# Patient Record
Sex: MALE | Race: ASIAN | NOT HISPANIC OR LATINO | Employment: FULL TIME | URBAN - METROPOLITAN AREA
[De-identification: names, ages, dates, MRNs, and addresses within clinical notes are randomized per-mention and may not be internally consistent; named-entity substitution may affect disease eponyms.]

---

## 2018-08-28 ENCOUNTER — TELEPHONE (OUTPATIENT)
Dept: FAMILY MEDICINE | Facility: CLINIC | Age: 48
End: 2018-08-28

## 2018-08-28 PROBLEM — H40.009 GLAUCOMA SUSPECT: Status: ACTIVE | Noted: 2018-08-28

## 2018-08-28 PROBLEM — J31.0 RHINITIS SICCA: Status: ACTIVE | Noted: 2018-01-02

## 2018-08-28 PROBLEM — J34.89 NASAL VESTIBULITIS: Status: ACTIVE | Noted: 2018-01-02

## 2018-08-28 PROBLEM — M31.30: Status: ACTIVE | Noted: 2018-01-02

## 2018-08-28 RX ORDER — VIT C/E/ZN/COPPR/LUTEIN/ZEAXAN 250MG-90MG
5000 CAPSULE ORAL
COMMUNITY
Start: 2017-10-23

## 2018-08-28 NOTE — TELEPHONE ENCOUNTER
Spoke with pt who reports that he has been experiencing some tightness to his calf. Notes that he is active ad thinks that he might have pulled a muscle  Denies fever, chills, SOB, calf pain, swelling, warmth or redness.   Denies air travel or long car rides. Denies hx of clots  Pt will schedule appt for evaluation and was instructed to go directly to ED if he experiences any of the above symptoms

## 2018-08-29 ENCOUNTER — OFFICE VISIT (OUTPATIENT)
Dept: FAMILY MEDICINE | Facility: CLINIC | Age: 48
End: 2018-08-29
Payer: COMMERCIAL

## 2018-08-29 ENCOUNTER — TELEPHONE (OUTPATIENT)
Dept: FAMILY MEDICINE | Facility: CLINIC | Age: 48
End: 2018-08-29

## 2018-08-29 ENCOUNTER — HOSPITAL ENCOUNTER (OUTPATIENT)
Dept: RADIOLOGY | Age: 48
Discharge: HOME | End: 2018-08-29
Attending: FAMILY MEDICINE
Payer: COMMERCIAL

## 2018-08-29 VITALS
TEMPERATURE: 98 F | DIASTOLIC BLOOD PRESSURE: 82 MMHG | WEIGHT: 135 LBS | BODY MASS INDEX: 23.05 KG/M2 | HEIGHT: 64 IN | HEART RATE: 81 BPM | OXYGEN SATURATION: 98 % | SYSTOLIC BLOOD PRESSURE: 120 MMHG

## 2018-08-29 DIAGNOSIS — M79.662 PAIN OF LEFT CALF: ICD-10-CM

## 2018-08-29 DIAGNOSIS — M79.662 PAIN OF LEFT CALF: Primary | ICD-10-CM

## 2018-08-29 PROBLEM — I34.0 MITRAL VALVE INSUFFICIENCY: Status: ACTIVE | Noted: 2018-08-29

## 2018-08-29 PROBLEM — E78.5 DYSLIPIDEMIA: Status: ACTIVE | Noted: 2018-08-29

## 2018-08-29 PROCEDURE — 99214 OFFICE O/P EST MOD 30 MIN: CPT | Performed by: FAMILY MEDICINE

## 2018-08-29 PROCEDURE — 93971 EXTREMITY STUDY: CPT | Mod: LT

## 2018-08-29 RX ORDER — MELOXICAM 15 MG/1
15 TABLET ORAL DAILY
Qty: 15 TABLET | Refills: 0 | Status: SHIPPED | OUTPATIENT
Start: 2018-08-29 | End: 2018-10-24 | Stop reason: ALTCHOICE

## 2018-08-29 ASSESSMENT — ENCOUNTER SYMPTOMS
HEMATURIA: 0
SORE THROAT: 0
MYALGIAS: 1
FATIGUE: 0
ABDOMINAL PAIN: 0
DYSURIA: 0
SHORTNESS OF BREATH: 0
HEADACHES: 0
FEVER: 0

## 2018-08-29 NOTE — PROGRESS NOTES
[facility] at [facility]     Reason for visit:   Chief Complaint   Patient presents with   • Leg Pain      HPI  Has had left calf pain for about 5 days. Feels like a tightness or a pull. Is active: plays competitive [employer] 5 times a week. No swelling. Tried advil: no much help. No specific injury that he is aware of.  The pain is a 5 out of 10 and it is worse when he walks but the very worst is when he is sitting. . It tingles in his lower leg and foot at time but it is fleeting. No back pain. He is worried about a possible blood clot      Sushant Hazel is a 48 y.o. 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 No pertinent surgical history.  Social History     Social History   • Marital status:      Spouse name: N/A   • Number of children: N/A   • Years of education: N/A     Occupational History   • Not on file.     Social History Main Topics   • Smoking status: Never Smoker   • Smokeless tobacco: Never Used   • Alcohol use Yes      Comment: Socially    • Drug use: Unknown   • Sexual activity: Not on file     Other Topics Concern   • Not on file     Social History Narrative   • No narrative on file       Family History   Problem Relation Age of Onset   • Hyperlipidemia Mother    • Hyperlipidemia Father    • No Known Problems Sister    • No Known Problems Brother    • Prostate cancer Other    •  "Diabetes Other    • Hypertension Other        No Known Allergies    Current Outpatient Prescriptions   Medication Sig Dispense Refill   • cholecalciferol, vitamin D3, (VITAMIN D3) 1,000 unit capsule 2 capsules po once a day     • meloxicam (MOBIC) 15 mg tablet Take 1 tablet (15 mg total) by mouth daily. Take 1 tablet by oral route once a day with food 15 tablet 0     No current facility-administered medications for this visit.      Review of Systems   Constitutional: Negative for fatigue and fever.   HENT: Negative for ear pain and sore throat.    Respiratory: Negative for shortness of breath.    Cardiovascular: Negative for chest pain.   Gastrointestinal: Negative for abdominal pain.   Genitourinary: Negative for dysuria and hematuria.   Musculoskeletal: Positive for myalgias (left leg).   Skin: Negative for rash.   Neurological: Negative for headaches.     Objective   Vitals:    08/29/18 0912   BP: 120/82   BP Location: Right upper arm   Patient Position: Sitting   Pulse: 81   Temp: 36.7 °C (98 °F)   TempSrc: Oral   SpO2: 98%   Weight: 61.2 kg (135 lb)   Height: 1.626 m (5' 4\")     Body mass index is 23.17 kg/m².    Physical Exam   Constitutional: He appears well-developed and well-nourished. No distress.   HENT:   Head: Normocephalic and atraumatic.   Right Ear: Tympanic membrane and ear canal normal.   Left Ear: Tympanic membrane and ear canal normal.   Mouth/Throat: Oropharynx is clear and moist.   External nose normal   Eyes: Conjunctivae and lids are normal. Pupils are equal, round, and reactive to light.   Neck: No thyromegaly present.   Cardiovascular: Normal rate, regular rhythm and normal heart sounds.  Exam reveals no gallop and no friction rub.    No murmur heard.  Pulmonary/Chest: Effort normal and breath sounds normal. He exhibits no deformity.   Abdominal: Soft. There is no tenderness.   Musculoskeletal: He exhibits no edema.   Left lower extremity: no swelling. Positive calf tenderness. Negative " homans    Lymphadenopathy:     He has no cervical adenopathy.   Neurological: He is alert.   Skin: No cyanosis. Nails show no clubbing.   Psychiatric: He has a normal mood and affect.     Procedures       POINT OF CARE TESTING:    No results found for this visit on 08/29/18.     Assessment   Problem List Items Addressed This Visit     None      Visit Diagnoses     Pain of left calf    -  Primary    check ultrasound to rule out DVT. Meloxicam 15 mg once a day #15 as long as negative    Relevant Orders    Ultrasound venous leg left      He'll check on Tdap covergae     Educated patient on any new medications/doses that I prescribed today and patient expressed understanding and patient expressed understanding of and agreement with plan  No Follow-up on file.     Mercy Walton MD  8/29/2018

## 2018-08-29 NOTE — TELEPHONE ENCOUNTER
----- Message from DORA Gimenez sent at 8/29/2018  3:58 PM EDT -----  Please inform pt that his US is neg for DVT

## 2018-08-31 ENCOUNTER — HOSPITAL ENCOUNTER (EMERGENCY)
Facility: HOSPITAL | Age: 48
Discharge: HOME | End: 2018-08-31
Attending: EMERGENCY MEDICINE
Payer: COMMERCIAL

## 2018-08-31 ENCOUNTER — APPOINTMENT (EMERGENCY)
Dept: RADIOLOGY | Facility: HOSPITAL | Age: 48
End: 2018-08-31
Attending: EMERGENCY MEDICINE
Payer: COMMERCIAL

## 2018-08-31 ENCOUNTER — APPOINTMENT (EMERGENCY)
Dept: RADIOLOGY | Facility: HOSPITAL | Age: 48
End: 2018-08-31
Payer: COMMERCIAL

## 2018-08-31 VITALS
HEART RATE: 67 BPM | SYSTOLIC BLOOD PRESSURE: 130 MMHG | RESPIRATION RATE: 16 BRPM | OXYGEN SATURATION: 98 % | BODY MASS INDEX: 22.36 KG/M2 | WEIGHT: 131 LBS | HEIGHT: 64 IN | TEMPERATURE: 97.3 F | DIASTOLIC BLOOD PRESSURE: 79 MMHG

## 2018-08-31 DIAGNOSIS — R07.9 CHEST PAIN, UNSPECIFIED TYPE: Primary | ICD-10-CM

## 2018-08-31 DIAGNOSIS — R51.9 ACUTE NONINTRACTABLE HEADACHE, UNSPECIFIED HEADACHE TYPE: ICD-10-CM

## 2018-08-31 LAB
ALBUMIN SERPL-MCNC: 4.1 G/DL (ref 3.4–5)
ALP SERPL-CCNC: 76 IU/L (ref 35–126)
ALT SERPL-CCNC: 38 IU/L (ref 16–63)
ANION GAP SERPL CALC-SCNC: 8 MEQ/L (ref 3–15)
AST SERPL-CCNC: 43 IU/L (ref 15–41)
BASOPHILS # BLD: 0.02 K/UL (ref 0.01–0.1)
BASOPHILS NFR BLD: 0.4 %
BILIRUB SERPL-MCNC: 0.8 MG/DL (ref 0.3–1.2)
BUN SERPL-MCNC: 21 MG/DL (ref 8–20)
CALCIUM SERPL-MCNC: 9 MG/DL (ref 8.9–10.3)
CHLORIDE SERPL-SCNC: 103 MMOL/L (ref 98–109)
CO2 SERPL-SCNC: 26 MMOL/L (ref 22–32)
CREAT SERPL-MCNC: 1.1 MG/DL (ref 0.8–1.3)
D DIMER PPP IA.FEU-MCNC: 0.64 UG/ML (ref 0–0.5)
DIFFERENTIAL METHOD BLD: NORMAL
EOSINOPHIL # BLD: 0.12 K/UL (ref 0.04–0.54)
EOSINOPHIL NFR BLD: 2.2 %
ERYTHROCYTE [DISTWIDTH] IN BLOOD BY AUTOMATED COUNT: 11.7 % (ref 11.6–14.4)
GFR SERPL CREATININE-BSD FRML MDRD: >60 ML/MIN/1.73M*2
GLUCOSE SERPL-MCNC: 95 MG/DL (ref 70–99)
HCT VFR BLDCO AUTO: 44.7 % (ref 40.1–51)
HGB BLD-MCNC: 15.8 G/DL (ref 13.7–17.5)
IMM GRANULOCYTES # BLD AUTO: 0.02 K/UL (ref 0–0.08)
IMM GRANULOCYTES NFR BLD AUTO: 0.4 %
LYMPHOCYTES # BLD: 2.06 K/UL (ref 1.2–3.5)
LYMPHOCYTES NFR BLD: 38.4 %
MCH RBC QN AUTO: 32.5 PG (ref 28–33.2)
MCHC RBC AUTO-ENTMCNC: 35.3 G/DL (ref 32.2–36.5)
MCV RBC AUTO: 92 FL (ref 83–98)
MONOCYTES # BLD: 0.51 K/UL (ref 0.3–1)
MONOCYTES NFR BLD: 9.5 %
NEUTROPHILS # BLD: 2.64 K/UL (ref 1.7–7)
NEUTS SEG NFR BLD: 49.1 %
NRBC BLD-RTO: 0 %
PDW BLD AUTO: 11.2 FL (ref 9.4–12.4)
PLATELET # BLD AUTO: 181 K/UL (ref 150–350)
POTASSIUM SERPL-SCNC: 4.3 MMOL/L (ref 3.6–5.1)
PROT SERPL-MCNC: 6.9 G/DL (ref 6–8.2)
RBC # BLD AUTO: 4.86 M/UL (ref 4.5–5.8)
SODIUM SERPL-SCNC: 137 MMOL/L (ref 136–144)
TROPONIN I SERPL-MCNC: <0.02 NG/ML
WBC # BLD AUTO: 5.37 K/UL (ref 3.8–10.5)

## 2018-08-31 PROCEDURE — 63700000 HC SELF-ADMINISTRABLE DRUG: Performed by: NURSE PRACTITIONER

## 2018-08-31 PROCEDURE — 25800000 HC PHARMACY IV SOLUTIONS: Performed by: NURSE PRACTITIONER

## 2018-08-31 PROCEDURE — 63600105 HC IODINE BASED CONTRAST: Performed by: NURSE PRACTITIONER

## 2018-08-31 PROCEDURE — 71260 CT THORAX DX C+: CPT

## 2018-08-31 PROCEDURE — 85379 FIBRIN DEGRADATION QUANT: CPT | Performed by: NURSE PRACTITIONER

## 2018-08-31 PROCEDURE — 71046 X-RAY EXAM CHEST 2 VIEWS: CPT

## 2018-08-31 PROCEDURE — 85025 COMPLETE CBC W/AUTO DIFF WBC: CPT | Performed by: NURSE PRACTITIONER

## 2018-08-31 PROCEDURE — 36415 COLL VENOUS BLD VENIPUNCTURE: CPT | Performed by: NURSE PRACTITIONER

## 2018-08-31 PROCEDURE — 3E0337Z INTRODUCTION OF ELECTROLYTIC AND WATER BALANCE SUBSTANCE INTO PERIPHERAL VEIN, PERCUTANEOUS APPROACH: ICD-10-PCS | Performed by: EMERGENCY MEDICINE

## 2018-08-31 PROCEDURE — 99284 EMERGENCY DEPT VISIT MOD MDM: CPT | Mod: 25

## 2018-08-31 PROCEDURE — 80053 COMPREHEN METABOLIC PANEL: CPT | Performed by: NURSE PRACTITIONER

## 2018-08-31 PROCEDURE — 96360 HYDRATION IV INFUSION INIT: CPT | Mod: 59

## 2018-08-31 PROCEDURE — 84484 ASSAY OF TROPONIN QUANT: CPT | Performed by: NURSE PRACTITIONER

## 2018-08-31 PROCEDURE — 93005 ELECTROCARDIOGRAM TRACING: CPT | Performed by: NURSE PRACTITIONER

## 2018-08-31 RX ORDER — ACETAMINOPHEN 325 MG/1
650 TABLET ORAL ONCE
Status: COMPLETED | OUTPATIENT
Start: 2018-08-31 | End: 2018-08-31

## 2018-08-31 RX ADMIN — ACETAMINOPHEN 650 MG: 325 TABLET, FILM COATED ORAL at 23:42

## 2018-08-31 RX ADMIN — IOHEXOL 90 ML: 350 INJECTION, SOLUTION INTRAVENOUS at 22:15

## 2018-08-31 RX ADMIN — SODIUM CHLORIDE 1000 ML: 9 INJECTION, SOLUTION INTRAVENOUS at 22:41

## 2018-08-31 ASSESSMENT — ENCOUNTER SYMPTOMS
FEVER: 0
SINUS PAIN: 0
ABDOMINAL PAIN: 0
SINUS PRESSURE: 0
HEADACHES: 1
DIZZINESS: 0
NAUSEA: 0
SHORTNESS OF BREATH: 1
BACK PAIN: 0
LIGHT-HEADEDNESS: 0
VOMITING: 0
CHILLS: 0

## 2018-09-01 LAB
ATRIAL RATE: 68
P AXIS: 67
PR INTERVAL: 128
QRS DURATION: 80
QT INTERVAL: 364
QTC CALCULATION(BAZETT): 387
R AXIS: 29
RAINBOW HOLD SPECIMEN: NORMAL
T WAVE AXIS: 29
VENTRICULAR RATE: 68

## 2018-09-01 NOTE — ED ATTESTATION NOTE
Procedures  Physical Exam  Review of Systems    8/31/20189:28 PM  I have personally seen and examined the patient.  I reviewed and agree with the PA/NP/Resident's assessment and plan of care.    My examination, assessment, and plan of care of Sushant Hazel is as follows:    The patient presents with chest pain yesterday for 5 min described as a pain that hurt when he took a deep breath.  Was sitting on couch when it happened.    Exam: Lungs clear, heart normal, abdomen soft and non-tender.    Impression/Plan: EKG, chest x-ray neg.  Labs are pending.  Agree with NP management.    D-dimer was elevated so CT scan chest done to be sure there was no PE.  None found.  Agree with discharge.        Volodymyr Le MD  08/31/18 4881       Volodymyr Le MD  08/31/18 1384

## 2018-09-01 NOTE — ED PROVIDER NOTES
"HPI      48 y.o. male with pmh of mitral valve insufficiency presents to ED c/o cp x 1 day. He notes last night when taking a deep breath he felt a \"sharp cramp and squeezing\" in left sided chest lasting for 5 minutes associated with SOB and nausea that resolved on its own. Pt states currently has no cp but has HA located on top and posterior head. He took ASA PTA which provided no relief. Pt had stress test in 2017 that showed mitral valve insufficiency. Denies pmh/FHx of DVT, congestion, recent travel, abd pain, fever, chills, diaphoresis, back pain, sinus sxs.    Cardiologist Dr. Johnson        Chief Complaint   Patient presents with   • Chest Pain         History provided by:  Patient   used: No         Patient History     Past Medical History:   Diagnosis Date   • Kidney stones 06/2011   • Lyme disease 04/2004       History reviewed. No pertinent surgical history.    Family History   Problem Relation Age of Onset   • Hyperlipidemia Mother    • Hyperlipidemia Father    • No Known Problems Sister    • No Known Problems Brother    • Prostate cancer Other    • Diabetes Other    • Hypertension Other        Social History   Substance Use Topics   • Smoking status: Never Smoker   • Smokeless tobacco: Never Used   • Alcohol use Yes      Comment: Socially        Systems Reviewed from Nursing Triage:  Tobacco  Allergies  Meds  Problems  Med Hx  Surg Hx  Soc Hx         Review of Systems     Review of Systems   Constitutional: Negative for chills and fever.   HENT: Negative for congestion, sinus pain and sinus pressure.    Respiratory: Positive for shortness of breath (resolved).    Cardiovascular: Positive for chest pain.   Gastrointestinal: Negative for abdominal pain, nausea and vomiting.   Musculoskeletal: Negative for back pain.   Skin: Negative for rash.   Neurological: Positive for headaches. Negative for dizziness and light-headedness.   All other systems reviewed and are negative.       " Physical Exam     ED Triage Vitals   Temp Heart Rate Resp BP SpO2   08/31/18 2050 08/31/18 2050 08/31/18 2050 08/31/18 2050 08/31/18 2050   36.3 °C (97.3 °F) 73 16 (!) 165/98 97 %      Temp Source Heart Rate Source Patient Position BP Location FiO2 (%) (Set)   08/31/18 2050 08/31/18 2345 08/31/18 2050 08/31/18 2050 --   Oral Monitor Sitting Right upper arm        Pulse Ox %: 97 % (08/31/18 2132)  Pulse Ox Interpretation: Normal (08/31/18 2132)  Heart Rate: 73 (08/31/18 2132)  Rhythm Strip Interpretation: Normal Sinus Rhythm (08/31/18 2132)    No data found.          Physical Exam   Constitutional: He is oriented to person, place, and time. He appears well-developed and well-nourished. No distress.   HENT:   Head: Normocephalic and atraumatic.   Eyes: Conjunctivae are normal. Pupils are equal, round, and reactive to light.   Neck: Normal range of motion.   Cardiovascular: Normal rate, regular rhythm and normal heart sounds.    Pulmonary/Chest: Effort normal.   Abdominal: Soft. There is no tenderness.   Musculoskeletal: Normal range of motion.   Neurological: He is alert and oriented to person, place, and time.   CN grossly intact as tested   Skin: Skin is warm and dry. No rash noted.   Psychiatric: He has a normal mood and affect. His behavior is normal.   Nursing note and vitals reviewed.           Procedures    ED Course & MDM     Labs Reviewed   COMPREHENSIVE METABOLIC PANEL - Abnormal        Result Value    Sodium 137      Potassium 4.3      Chloride 103      CO2 26      BUN 21 (*)     Creatinine 1.1      Glucose 95      Calcium 9.0      AST (SGOT) 43 (*)     ALT (SGPT) 38      Alkaline Phosphatase 76      Total Protein 6.9      Albumin 4.1      Bilirubin, Total 0.8      eGFR >60.0      Anion Gap 8     D-DIMER - Abnormal     D-Dimer, Quant (FEU) 0.64 (*)    TROPONIN I - Normal    Troponin I <0.02     CBC - Normal    WBC 5.37      RBC 4.86      Hemoglobin 15.8      Hematocrit 44.7      MCV 92.0      MCH 32.5       MCHC 35.3      RDW 11.7      Platelets 181      MPV 11.2     RAINBOW DRAW PANEL    Narrative:     The following orders were created for panel order [place] Draw Panel.  Procedure                               Abnormality         Status                     ---------                               -----------         ------                     RAINBOW RED[[id number]]                                       Final result               RAINBOW LT BLUE[[id number]]                                   Final result               RAINBOW GOLD[[id number]]                                      Final result                 Please view results for these tests on the individual orders.   CBC AND DIFFERENTIAL    Narrative:     The following orders were created for panel order CBC and differential.  Procedure                               Abnormality         Status                     ---------                               -----------         ------                     CBC[[id number]]                           Normal              Final result               Diff Count[[id number]]                                        Final result                 Please view results for these tests on the individual orders.   DIFF COUNT    Differential Type Auto      nRBC 0.0      Immature Granulocytes 0.4      Neutrophils 49.1      Lymphocytes 38.4      Monocytes 9.5      Eosinophils 2.2      Basophils 0.4      Immature Granulocytes, Absolute 0.02      Neutrophils, Absolute 2.64      Lymphocytes, Absolute 2.06      Monocytes, Absolute 0.51      Eosinophils, Absolute 0.12      Basophils, Absolute 0.02     RAINBOW RED    [place] Tube RAINBOW HOLD SPECIMEN     RAINBOW LT BLUE    [place] Tube RAINBOW HOLD SPECIMEN     RAINBOW GOLD    [place] Tube RAINBOW HOLD SPECIMEN         ECG 12 lead   ED Interpretation   HR 68bpm, NSR, normal axis, normal NV, no ST elevation or depression      Final Result      X-RAY CHEST 2 VIEWS   ED Interpretation   No acute disease      EPP &  MS      Final Result   IMPRESSION:   No active disease.         CT CHEST PULMONARY EMBOLISM WITH IV CONTRAST   ED Interpretation   Preliminary Results:       FINDINGS:       Lungs: Clearly bilaterally. Central airways patent bilaterally.       Pulmonary Arteries: No pulmonary embolism.        Heart: Enlarged. No pericardial effusion.        Aorta: Patent/normal in caliber.        Lymph Nodes: No pathologic lymph nodes.       Imaged Abdomen: grossly normal.       Other: Mild circumferential wall thickening proximal and distal esophagus, likely secondary to underdistension.        Bones: Normal        IMPRESSION:    1) No evidence for pulmonary embolism.    2) No acute pulmonary pathology    3) Cardiomegaly.       Interpreted by: Eloy Mahmood MD, Aug 31, 2018 10:42 PM      Final Result   IMPRESSION: No evidence of pulmonary emboli.  No active disease seen in the   chest      COMMENT:      LOWER NECK: unremarkable.      AXILLA: No adenopathy seen..      PLEURA:  no significant abnormality seen..      LUNG PARENCHYMA:  no significant findings.      AORTA:  There is no significant abnormality.      MEDIASTINUM AND VERITO: There are no pulmonary emboli.  There is no adenopathy      HEART AND PERICARDIUM:  appears unremarkable.      CHEST WALL:  no significant abnormality.      BONES: no significant abnormality.      UPPER ABDOMEN AND ADRENAL GLANDS: no significant abnormality.                          MDM  Number of Diagnoses or Management Options  Acute nonintractable headache, unspecified headache type:   Chest pain, unspecified type:      Amount and/or Complexity of Data Reviewed  Clinical lab tests: reviewed  Tests in the medicine section of CPT®: reviewed  Discuss the patient with other providers: yes  Independent visualization of images, tracings, or specimens: yes    Patient Progress  Patient progress: stable           ED Course as of Sep 05 2158   Fri Aug 31, 2018   2122 Impression: Chest pain x1 episode, now  resolved and headache, no focal neuro deficits on exam, well appearing in room  Plan: labs, trop, Ddimer, EKG, cxray, IV fluids, observe  [EP]   2131 Troponin I: <0.02 [EP]   2157 D/w patient, CT chest ordered for r/o PE D-Dimer, Quant (FEU): (!) 0.64 [EP]   2255 CT chest shows: IMPRESSION:   1) No evidence for pulmonary embolism.   2) No acute pulmonary pathology   3) Cardiomegaly.     D/w patient, he is well appearing in room, no acute distress, reports chest pain still not present, O2 sat 98% on RA, HR 70's  Tylenol ordered for headache  Counseled to f/u with his cardiologist and strict return precautions  [EP]      ED Course User Index  [EP] Pallante, Elizabeth P, CRNP         Clinical Impressions as of Sep 05 2158   Chest pain, unspecified type   Acute nonintractable headache, unspecified headache type                  By signing my name below, Rachna PATEL, attest that this documentation has been prepared under the direction and in the presence of E. Pallante CRNP.    8/31/2018 9:17 PM      I, Elizabeth P. Pallante, personally performed the services described in this documentation, as documented by the scribe in my presence, and it is both accurate and complete.  9/5/2018 9:59 PM              Rachna Thapa  08/31/18 2125       Pallante, Elizabeth P, CRNP  09/05/18 2159

## 2018-09-01 NOTE — DISCHARGE INSTRUCTIONS
Drink plenty of fluids.    Take Tylenol for pain.    If you develop worsening chest pain, headache, fevers (>100.4) difficulty breathing or any other new or concerning symptoms return to the Emergency Department.

## 2018-09-03 PROBLEM — E78.2 MIXED HYPERLIPIDEMIA: Status: ACTIVE | Noted: 2018-08-29

## 2018-09-07 ENCOUNTER — OFFICE VISIT (OUTPATIENT)
Dept: CARDIOLOGY | Facility: CLINIC | Age: 48
End: 2018-09-07
Payer: COMMERCIAL

## 2018-09-07 VITALS
DIASTOLIC BLOOD PRESSURE: 68 MMHG | BODY MASS INDEX: 23.17 KG/M2 | OXYGEN SATURATION: 98 % | HEART RATE: 74 BPM | WEIGHT: 135 LBS | SYSTOLIC BLOOD PRESSURE: 122 MMHG

## 2018-09-07 DIAGNOSIS — R07.89 ATYPICAL CHEST PAIN: ICD-10-CM

## 2018-09-07 DIAGNOSIS — I34.0 NON-RHEUMATIC MITRAL REGURGITATION: ICD-10-CM

## 2018-09-07 DIAGNOSIS — E78.2 MIXED HYPERLIPIDEMIA: Primary | ICD-10-CM

## 2018-09-07 PROCEDURE — 99214 OFFICE O/P EST MOD 30 MIN: CPT | Performed by: INTERNAL MEDICINE

## 2018-09-07 RX ORDER — ADHESIVE BANDAGE
30 BANDAGE TOPICAL DAILY
COMMUNITY
End: 2020-06-19 | Stop reason: ALTCHOICE

## 2018-09-07 RX ORDER — UBIDECARENONE 100 MG
100 CAPSULE ORAL DAILY
COMMUNITY

## 2018-09-07 NOTE — ASSESSMENT & PLAN NOTE
Reviewed that it was mild and reassured.  Recommended that usually this is reevaluated 3-5 years which would mean 2-4 years from now.

## 2018-09-07 NOTE — PROGRESS NOTES
Cardiology Outpatient  Progress note    Sushant Hazel is a 48 y.o. male  who presents with an episode of chest pain for which he went to emergency department on August 31.  I reviewed the ER records, manually elevated d-dimer prompting a CTA which was unremarkable.  Troponin of the labs were negative except AST was 43 with a normal less than 41.    His initial symptom was a few episodes of chest pain worse with inspiration, pleuritic despite absence of any upper upper respiratory infection or trauma.  He has had this in the past but this 1 lasted a little longer, but over 5 minutes was little more intense prompting the evaluation.  His symptoms have since resolved.    In the emergency room he also told me is some follow-up regarding his mitral insufficiency which was noted on a stress echo from April 2017 and was mild.  Normally he is active without any cardiovascular symptoms.  No classic angina or exertional dyspnea etc.    Past medical, family, social history reviewed and there has been no significant interval change.     Past Medical History:   Diagnosis Date   • Kidney stones 06/2011   • Lyme disease 04/2004       : History reviewed. No pertinent surgical history.    Allergies:   Patient has no known allergies.    Current Outpatient Prescriptions   Medication Sig Dispense Refill   • cholecalciferol, vitamin D3, (VITAMIN D3) 1,000 unit capsule 2 capsules po once a day     • coenzyme Q10 (COQ10) 100 mg capsule Take 100 mg by mouth daily.     • magnesium hydroxide (M.O.M.) 400 mg/5 mL suspension Take 30 mL by mouth daily.     • meloxicam (MOBIC) 15 mg tablet Take 1 tablet (15 mg total) by mouth daily. Take 1 tablet by oral route once a day with food 15 tablet 0     No current facility-administered medications for this visit.           Social History     Social History   • Marital status:      Spouse name: N/A   • Number of children: N/A   • Years of education: N/A     Social History Main Topics   •  Smoking status: Never Smoker   • Smokeless tobacco: Never Used   • Alcohol use Yes      Comment: Socially    • Drug use: No   • Sexual activity: Not Asked     Other Topics Concern   • None     Social History Narrative   • None          Family History   Problem Relation Age of Onset   • Hyperlipidemia Mother    • Hyperlipidemia Father    • No Known Problems Sister    • No Known Problems Brother    • Prostate cancer Other    • Diabetes Other    • Hypertension Other        ROS  Review of Systems   Constitution: Negative for diaphoresis and fever.   HENT: Negative for hoarse voice and nosebleeds.    Eyes: Negative for blurred vision and visual disturbance.   Respiratory: Negative for hemoptysis and shortness of breath.    Endocrine: Negative for cold intolerance, heat intolerance and polydipsia.   Skin: Negative for rash.   Musculoskeletal: Negative for falls, muscle weakness and myalgias.   Gastrointestinal: Negative for hematemesis, hematochezia and jaundice.   Genitourinary: Negative for dysuria.   Neurological: Negative for brief paralysis, light-headedness and tremors.   Psychiatric/Behavioral: Negative for altered mental status and hallucinations.     Objective     Vitals:    09/07/18 1404   BP: 122/68   Pulse: 74   SpO2: 98%       Physical Exam  Physical Exam   Constitutional: He is oriented to person, place, and time. He appears well-developed.   HENT:   Head: Normocephalic.   Mouth/Throat: Oropharynx is clear and moist.   Eyes: Right eye exhibits no discharge. Left eye exhibits no discharge. No scleral icterus.   Neck: No JVD present. No tracheal deviation present. No thyromegaly present.   Cardiovascular: Normal rate and regular rhythm.  Exam reveals no gallop and no friction rub.    No murmur heard.  No chest wall pain or costochondral pain.  Pulmonary/Chest: Effort normal and breath sounds normal. No respiratory distress. He has no wheezes. He has no rales. He exhibits no tenderness.   Abdominal: He exhibits  no distension. There is no tenderness. There is no guarding.   Musculoskeletal: He exhibits no edema, tenderness or deformity.   Neurological: He is alert and oriented to person, place, and time.   Skin: Skin is warm and dry.   Psychiatric: He has a normal mood and affect.     Labs   Lab Results   Component Value Date    WBC 5.37 08/31/2018    HGB 15.8 08/31/2018    HCT 44.7 08/31/2018     08/31/2018    CHOL 196 10/19/2017    TRIG 222 (H) 10/19/2017    HDL 40 (L) 10/19/2017    ALT 38 08/31/2018    AST 43 (H) 08/31/2018     08/31/2018    K 4.3 08/31/2018     08/31/2018    CREATININE 1.1 08/31/2018    BUN 21 (H) 08/31/2018    CO2 26 08/31/2018       Imaging CTA pulmonary from 8/31:  No evidence of pulmonary emboli.  No active disease seen in the chest.    ECG EKG:  ECG from August 31 within normal limits    Problem List Items Addressed This Visit        Cardiology Problems    Mixed hyperlipidemia - Primary    Overview     Overview: 2017 calcium score 0         Current Assessment & Plan     Optimal diet and this can be reevaluated as he ages repeat screening etc.  Options discussed         Mitral valve insufficiency    Overview     Overview: 4/17 mild         Current Assessment & Plan     Reviewed that it was mild and reassured.  Recommended that usually this is reevaluated 3-5 years which would mean 2-4 years from now.            Other    Atypical chest pain    Current Assessment & Plan     This appear pleuritic lasting a little longer than 5 minutes.  He has similar less prolonged episodes in the past but this was little longer prompting emergency department evaluation on August 31.  I reviewed the CT was normal.  The only lab abnormality was an AST of 43 which was reviewed.  Follow-up recommended.    Symptoms have resolved.  Given the atypical symptoms, negative troponin, unremarkable ECG, negative CTA performed secondary to mildly elevated d-dimer and knowing that he had a negative stress echo in  April 2017 and a coronary calcium score of 0 May 2017 I do not think of additional cardiac testing is needed.  However, stressed that regardless of the above if symptoms progress we should probably reevaluate.             This patient note has been dictated using speech recognition software. Inadvertent speech recognition errors should be disregarded. Please do not hesitate to call my office for clarifications.    Khoi Johnson, DO  9/7/2018 4:01 PM

## 2018-09-07 NOTE — ASSESSMENT & PLAN NOTE
This appear pleuritic lasting a little longer than 5 minutes.  He has similar less prolonged episodes in the past but this was little longer prompting emergency department evaluation on August 31.  I reviewed the CT was normal.  The only lab abnormality was an AST of 43 which was reviewed.  Follow-up recommended.    Symptoms have resolved.  Given the atypical symptoms, negative troponin, unremarkable ECG, negative CTA performed secondary to mildly elevated d-dimer and knowing that he had a negative stress echo in April 2017 and a coronary calcium score of 0 May 2017 I do not think of additional cardiac testing is needed.  However, stressed that regardless of the above if symptoms progress we should probably reevaluate.

## 2018-10-24 ENCOUNTER — OFFICE VISIT (OUTPATIENT)
Dept: FAMILY MEDICINE | Facility: CLINIC | Age: 48
End: 2018-10-24
Payer: COMMERCIAL

## 2018-10-24 VITALS
WEIGHT: 134 LBS | OXYGEN SATURATION: 98 % | HEIGHT: 63 IN | BODY MASS INDEX: 23.74 KG/M2 | HEART RATE: 77 BPM | DIASTOLIC BLOOD PRESSURE: 64 MMHG | TEMPERATURE: 98.5 F | SYSTOLIC BLOOD PRESSURE: 134 MMHG

## 2018-10-24 DIAGNOSIS — Z00.00 ADULT GENERAL MEDICAL EXAMINATION: Primary | ICD-10-CM

## 2018-10-24 DIAGNOSIS — R79.89 LOW VITAMIN D LEVEL: ICD-10-CM

## 2018-10-24 DIAGNOSIS — Z23 NEED FOR VACCINATION: ICD-10-CM

## 2018-10-24 PROBLEM — J34.89 NASAL VESTIBULITIS: Status: RESOLVED | Noted: 2018-01-02 | Resolved: 2018-10-24

## 2018-10-24 PROBLEM — R07.89 ATYPICAL CHEST PAIN: Status: RESOLVED | Noted: 2018-09-07 | Resolved: 2018-10-24

## 2018-10-24 PROBLEM — M31.30: Status: RESOLVED | Noted: 2018-01-02 | Resolved: 2018-10-24

## 2018-10-24 PROCEDURE — 99396 PREV VISIT EST AGE 40-64: CPT | Mod: 25 | Performed by: FAMILY MEDICINE

## 2018-10-24 PROCEDURE — 90471 IMMUNIZATION ADMIN: CPT | Performed by: FAMILY MEDICINE

## 2018-10-24 PROCEDURE — 90686 IIV4 VACC NO PRSV 0.5 ML IM: CPT | Performed by: FAMILY MEDICINE

## 2018-10-24 ASSESSMENT — ENCOUNTER SYMPTOMS
ROS SKIN COMMENTS: NO CHANGE IN MOLES
FEVER: 0
ARTHRALGIAS: 0
BRUISES/BLEEDS EASILY: 0
MYALGIAS: 0
DIAPHORESIS: 0
DYSURIA: 0
SORE THROAT: 0
ROS GI COMMENTS: NO CHANGE IN BOWEL HABITS
HEMATURIA: 0
NUMBNESS: 0
EYE PAIN: 0
DYSPHORIC MOOD: 0
ABDOMINAL PAIN: 0
FATIGUE: 0
SHORTNESS OF BREATH: 0

## 2018-10-24 NOTE — PROGRESS NOTES
[facility] at [facility]     Reason for visit:   Chief Complaint   Patient presents with   • Annual Exam      HPI  Feeling well overall. Eats a balanced low fat diet and does exercise: plays [employer] 3 times a week  Sees eye doctor every 1-2 years for routine exams      Sushant Hazel is a 48 y.o. male      The following have been reviewed and updated as appropriate in this visit  Patient Active Problem List    Diagnosis Date Noted   • Mixed hyperlipidemia 08/29/2018   • Mitral valve insufficiency 08/29/2018   • Glaucoma suspect 08/28/2018   • Rhinitis sicca 01/02/2018   • Cervical arthritis 04/28/2015     Past Medical History:   Diagnosis Date   • Kidney stones 06/2011   • Lyme disease 04/2004     History reviewed. No pertinent surgical history.  Social History     Social History   • Marital status:      Spouse name: N/A   • Number of children: N/A   • Years of education: N/A     Occupational History   • Not on file.     Social History Main Topics   • Smoking status: Never Smoker   • Smokeless tobacco: Never Used   • Alcohol use Yes      Comment: Socially    • Drug use: No   • Sexual activity: Not on file     Other Topics Concern   • Not on file     Social History Narrative   • No narrative on file       Family History   Problem Relation Age of Onset   • Hyperlipidemia Mother    • Hyperlipidemia Father    • No Known Problems Sister    • No Known Problems Brother    • Prostate cancer Other    • Diabetes Other    • Hypertension Other        No Known Allergies    Current Outpatient Prescriptions   Medication Sig Dispense Refill   • cholecalciferol, vitamin D3, (VITAMIN D3) 1,000 unit capsule 2 capsules po once a day     • coenzyme Q10 (COQ10) 100 mg capsule Take 100 mg by mouth daily.     • magnesium hydroxide (M.O.M.) 400 mg/5 mL suspension Take 30 mL by mouth daily.       No current facility-administered medications for this visit.      Review of Systems   Constitutional: Negative for  "diaphoresis, fatigue and fever.   HENT: Negative for congestion, ear pain and sore throat.    Eyes: Negative for pain.   Respiratory: Negative for shortness of breath.    Cardiovascular: Negative for chest pain.   Gastrointestinal: Negative for abdominal pain.        No change in bowel habits   Genitourinary: Negative for dysuria and hematuria.   Musculoskeletal: Negative for arthralgias and myalgias.   Skin: Negative for rash.        No change in moles   Neurological: Negative for numbness.        No focal weakness   Hematological: Does not bruise/bleed easily.   Psychiatric/Behavioral: Negative for dysphoric mood.        PHQ2 score=0     Objective   Vitals:    10/24/18 1156   BP: 134/64   Pulse: 77   Temp: 36.9 °C (98.5 °F)   SpO2: 98%   Weight: 60.8 kg (134 lb)   Height: 1.6 m (5' 3\")     Body mass index is 23.74 kg/m².       Visual Acuity Screening    Right eye Left eye Both eyes   Without correction: 20/50 20/30 20/25   With correction:      Comments: (-) color blind     Physical Exam   Constitutional: He appears well-developed and well-nourished. No distress.   HENT:   Head: Normocephalic and atraumatic.   Right Ear: Tympanic membrane and ear canal normal.   Left Ear: Tympanic membrane and ear canal normal.   Mouth/Throat: Oropharynx is clear and moist.   External nose Normal   Eyes: Conjunctivae, EOM and lids are normal. Pupils are equal, round, and reactive to light.   Fundi WNL   Neck: No thyromegaly present.   Cardiovascular: Normal rate and regular rhythm.  Exam reveals no gallop and no friction rub.    No murmur heard.  Carotids 2+ bilaterally. No bruits   Pulmonary/Chest: Effort normal and breath sounds normal. He exhibits no deformity.   Abdominal: Soft. Bowel sounds are normal. He exhibits no distension and no mass. There is no hepatosplenomegaly. There is no tenderness. There is no CVA tenderness.   Genitourinary:   Genitourinary Comments: No testicular mass or inguinal hernia bilaterally "   Musculoskeletal: He exhibits no edema or deformity.   Lymphadenopathy:     He has no cervical adenopathy.   Neurological: He is alert. He displays no Babinski's sign on the right side. He displays no Babinski's sign on the left side.   Reflex Scores:       Bicep reflexes are 2+ on the right side and 2+ on the left side.       Brachioradialis reflexes are 2+ on the right side and 2+ on the left side.       Patellar reflexes are 2+ on the right side and 2+ on the left side.       Achilles reflexes are 2+ on the right side and 2+ on the left side.  Cranial Nerves II-XII grossly intact  Motor grossly normal   Skin: No rash noted. No cyanosis. Nails show no clubbing.   No suspicious lesions   Psychiatric: He has a normal mood and affect.     Procedures       POINT OF CARE TESTING:    No results found for this visit on 10/24/18.     Assessment   Problem List Items Addressed This Visit     None      Visit Diagnoses     Adult general medical examination    -  Primary    well pt. UA done in office. Had EKG 8/2018 so not repeated today. Check labs as below    Relevant Orders    CBC and Differential    Comprehensive metabolic panel    Lipid panel    Vitamin D 25 hydroxy    Low vitamin D level        recheck level    Relevant Orders    Vitamin D 25 hydroxy    Need for vaccination        flu shot given    Relevant Orders    Influenza vaccine quadrivalent preservative free 6 mon and older IM (Completed)      He'll check on Tdap coverage     Educated patient on any new medications/doses that I prescribed today and patient expressed understanding and patient expressed understanding of and agreement with plan  No Follow-up on file.     Mercy Walton MD  10/24/2018

## 2019-10-02 ENCOUNTER — OFFICE VISIT (OUTPATIENT)
Dept: FAMILY MEDICINE | Facility: CLINIC | Age: 49
End: 2019-10-02
Payer: COMMERCIAL

## 2019-10-02 VITALS
WEIGHT: 140 LBS | TEMPERATURE: 98 F | SYSTOLIC BLOOD PRESSURE: 114 MMHG | HEART RATE: 76 BPM | RESPIRATION RATE: 17 BRPM | OXYGEN SATURATION: 97 % | HEIGHT: 63 IN | BODY MASS INDEX: 24.8 KG/M2 | DIASTOLIC BLOOD PRESSURE: 80 MMHG

## 2019-10-02 DIAGNOSIS — J01.10 ACUTE NON-RECURRENT FRONTAL SINUSITIS: Primary | ICD-10-CM

## 2019-10-02 PROCEDURE — 99214 OFFICE O/P EST MOD 30 MIN: CPT | Performed by: FAMILY MEDICINE

## 2019-10-02 RX ORDER — CODEINE PHOSPHATE AND GUAIFENESIN 10; 100 MG/5ML; MG/5ML
5 SOLUTION ORAL EVERY 6 HOURS PRN
Qty: 60 ML | Refills: 0 | Status: SHIPPED | OUTPATIENT
Start: 2019-10-02 | End: 2019-10-12

## 2019-10-02 RX ORDER — AMOXICILLIN AND CLAVULANATE POTASSIUM 875; 125 MG/1; MG/1
1 TABLET, FILM COATED ORAL 2 TIMES DAILY
Qty: 20 TABLET | Refills: 0 | Status: SHIPPED | OUTPATIENT
Start: 2019-10-02 | End: 2019-10-12

## 2019-10-02 ASSESSMENT — ENCOUNTER SYMPTOMS
SINUS PAIN: 1
DIARRHEA: 0
VOMITING: 0
NAUSEA: 0
SHORTNESS OF BREATH: 0
ABDOMINAL PAIN: 0
SORE THROAT: 0
EYE DISCHARGE: 1
FACIAL SWELLING: 0
SINUS PRESSURE: 1
FEVER: 0
ACTIVITY CHANGE: 0
WHEEZING: 0
APNEA: 0
EYE REDNESS: 1
CHOKING: 0
COUGH: 1
RHINORRHEA: 1
CHEST TIGHTNESS: 0
FATIGUE: 0
CONSTIPATION: 0
APPETITE CHANGE: 0
STRIDOR: 0
MYALGIAS: 0
CHILLS: 0

## 2019-10-02 NOTE — PROGRESS NOTES
[facility] at [facility]     Reason for visit:   Chief Complaint   Patient presents with   • Cough      Sushant Hazel is a 49 y.o. male presents to the office c/o HA, cough, sinus pressure.  Symptoms started 7 days ago and are getting worse.  Cough worse at night.  Keeping him up at night.  Productive green sputum  A/w discharge from right eye, facial pressure  No sick contacts   Denies smoking    OTC - Mucinex      Review of Systems   Constitutional: Negative for activity change, appetite change, chills, fatigue and fever.   HENT: Positive for congestion, postnasal drip, rhinorrhea, sinus pain and sinus pressure. Negative for ear discharge, ear pain, facial swelling, hearing loss and sore throat.    Eyes: Positive for discharge and redness.   Respiratory: Positive for cough. Negative for apnea, choking, chest tightness, shortness of breath, wheezing and stridor.    Cardiovascular: Negative for chest pain.   Gastrointestinal: Negative for abdominal pain, constipation, diarrhea, nausea and vomiting.   Musculoskeletal: Negative for myalgias.   All other systems reviewed and are negative.       Patient Active Problem List   Diagnosis   • Cervical arthritis   • Rhinitis sicca   • Glaucoma suspect   • Mixed hyperlipidemia   • Mitral valve insufficiency         Past Medical History:   Diagnosis Date   • Kidney stones 06/2011   • Lyme disease 04/2004     No past surgical history on file.  Social History     Social History   • Marital status:      Spouse name: N/A   • Number of children: N/A   • Years of education: N/A     Occupational History   • Not on file.     Social History Main Topics   • Smoking status: Never Smoker   • Smokeless tobacco: Never Used   • Alcohol use Yes      Comment: Socially    • Drug use: No   • Sexual activity: Not on file     Other Topics Concern   • Not on file     Social History Narrative   • No narrative on file     Family History   Problem Relation Age of Onset   •  "Hyperlipidemia Biological Mother    • Hyperlipidemia Biological Father    • No Known Problems Sister    • No Known Problems Brother    • Prostate cancer Other    • Diabetes Other    • Hypertension Other        Allergies:  Patient has no known allergies.    Current Outpatient Prescriptions   Medication Sig Dispense Refill   • cholecalciferol, vitamin D3, (VITAMIN D3) 1,000 unit capsule 2 capsules po once a day     • coenzyme Q10 (COQ10) 100 mg capsule Take 100 mg by mouth daily.     • magnesium hydroxide (M.O.M.) 400 mg/5 mL suspension Take 30 mL by mouth daily.     • amoxicillin-pot clavulanate (AUGMENTIN) 875-125 mg per tablet Take 1 tablet by mouth 2 (two) times a day for 10 days. 20 tablet 0   • codeine-guaifenesin (ROBITUSSIN-AC) 100-10 mg/5 mL liquid Take 5 mL by mouth every 6 (six) hours as needed for cough for up to 10 days. 60 mL 0     No current facility-administered medications for this visit.         Objective   Vitals:    10/02/19 0908   BP: 114/80   BP Location: Right upper arm   Patient Position: Sitting   Pulse: 76   Resp: 17   Temp: 36.7 °C (98 °F)   SpO2: 97%   Weight: 63.5 kg (140 lb)   Height: 1.6 m (5' 3\")     Ht Readings from Last 3 Encounters:   10/02/19 1.6 m (5' 3\")   10/24/18 1.6 m (5' 3\")   08/31/18 1.626 m (5' 4\")     Wt Readings from Last 3 Encounters:   10/02/19 63.5 kg (140 lb)   10/24/18 60.8 kg (134 lb)   09/07/18 61.2 kg (135 lb)     Body mass index is 24.8 kg/m².    Physical Exam   Constitutional: He is oriented to person, place, and time. He appears well-developed and well-nourished. No distress.   HENT:   Head: Normocephalic and atraumatic.   Right Ear: External ear and ear canal normal. Tympanic membrane is not injected, not scarred, not perforated, not erythematous, not retracted and not bulging. A middle ear effusion is present.   Left Ear: External ear and ear canal normal. Tympanic membrane is not injected, not scarred, not perforated, not erythematous, not retracted and not " bulging. A middle ear effusion is present.   Nose: Mucosal edema and rhinorrhea present. No nose lacerations or sinus tenderness. Right sinus exhibits maxillary sinus tenderness and frontal sinus tenderness. Left sinus exhibits maxillary sinus tenderness and frontal sinus tenderness.   Mouth/Throat: Mucous membranes are normal. Posterior oropharyngeal erythema present. No posterior oropharyngeal edema or tonsillar abscesses.   Eyes: Pupils are equal, round, and reactive to light. EOM and lids are normal. Right eye exhibits no chemosis, no discharge, no exudate and no hordeolum. No foreign body present in the right eye. Left eye exhibits no chemosis, no discharge, no exudate and no hordeolum. No foreign body present in the left eye. Right conjunctiva is injected. Right conjunctiva has no hemorrhage. Left conjunctiva is not injected. Left conjunctiva has no hemorrhage.   Cardiovascular: Normal rate, regular rhythm and normal heart sounds.  Exam reveals no gallop and no friction rub.    No murmur heard.  Pulmonary/Chest: Effort normal and breath sounds normal. No respiratory distress. He has no wheezes. He has no rales. He exhibits no tenderness.   Musculoskeletal: Normal range of motion. He exhibits no edema, tenderness or deformity.   Neurological: He is alert and oriented to person, place, and time.   Skin: Skin is warm. He is not diaphoretic.   Psychiatric: He has a normal mood and affect. His behavior is normal. Judgment and thought content normal.   Vitals reviewed.      Point of Care Testing Results  No results found for this visit on 10/02/19.     Assessment   Diagnoses and all orders for this visit:    Acute non-recurrent frontal sinusitis (Primary)  -     amoxicillin-pot clavulanate (AUGMENTIN) 875-125 mg per tablet; Take 1 tablet by mouth 2 (two) times a day for 10 days.  -     codeine-guaifenesin (ROBITUSSIN-AC) 100-10 mg/5 mL liquid; Take 5 mL by mouth every 6 (six) hours as needed for cough for up to 10  days.      Augmentin BID for ten days - can take with yogurt or probiotic  Caution advised when taking cough syrup.  PDMP accessed and reviewed  I do not believe he has conjunctivitis.  I believe his right eye is congested due to sinus infection  Recommend nasal steroid spray daily, saline rinses as needed, Mucinex  Tylenol or Advil as needed for HA    Patient has been educated on the risks, benefits, and side effects of all medication prescribed at this visit, and will contact me with any further questions.  Patient expressed understanding and agreement with plan.  Return if symptoms worsen or fail to improve, for Next scheduled follow-up.    Daya Cardona,   10/2/2019       There are no Patient Instructions on file for this visit.

## 2019-11-18 ENCOUNTER — OFFICE VISIT (OUTPATIENT)
Dept: FAMILY MEDICINE | Facility: CLINIC | Age: 49
End: 2019-11-18
Payer: COMMERCIAL

## 2019-11-18 VITALS
HEIGHT: 65 IN | WEIGHT: 142.5 LBS | BODY MASS INDEX: 23.74 KG/M2 | HEART RATE: 78 BPM | DIASTOLIC BLOOD PRESSURE: 78 MMHG | SYSTOLIC BLOOD PRESSURE: 116 MMHG | TEMPERATURE: 98 F | RESPIRATION RATE: 19 BRPM | OXYGEN SATURATION: 99 %

## 2019-11-18 DIAGNOSIS — Z00.00 ANNUAL PHYSICAL EXAM: Primary | ICD-10-CM

## 2019-11-18 DIAGNOSIS — Z00.00 ROUTINE MEDICAL EXAM: ICD-10-CM

## 2019-11-18 LAB
BILIRUBIN, POC: NEGATIVE
BLOOD URINE, POC: NEGATIVE
CLARITY, POC: CLEAR
COLOR, POC: YELLOW
GLUCOSE URINE, POC: NEGATIVE
KETONES, POC: NEGATIVE
LEUKOCYTE EST, POC: NEGATIVE
NITRITE, POC: NEGATIVE
PH, POC: 7
PROTEIN, POC: NEGATIVE
SPECIFIC GRAVITY, POC: 0.01
UROBILINOGEN, POC: 0.2

## 2019-11-18 PROCEDURE — 81002 URINALYSIS NONAUTO W/O SCOPE: CPT | Performed by: NURSE PRACTITIONER

## 2019-11-18 PROCEDURE — 99396 PREV VISIT EST AGE 40-64: CPT | Performed by: NURSE PRACTITIONER

## 2019-11-18 ASSESSMENT — ENCOUNTER SYMPTOMS
ENDOCRINE NEGATIVE: 1
HEADACHES: 0
EYE REDNESS: 0
NECK PAIN: 0
ABDOMINAL PAIN: 0
EYES NEGATIVE: 1
FEVER: 0
SHORTNESS OF BREATH: 0
SORE THROAT: 0
EYE DISCHARGE: 0
MYALGIAS: 0
FATIGUE: 0
RESPIRATORY NEGATIVE: 1
RHINORRHEA: 0
MUSCULOSKELETAL NEGATIVE: 1
CHEST TIGHTNESS: 0
DIZZINESS: 0
ALLERGIC/IMMUNOLOGIC NEGATIVE: 1
PALPITATIONS: 0
ARTHRALGIAS: 0
GASTROINTESTINAL NEGATIVE: 1
NEUROLOGICAL NEGATIVE: 1
POLYDIPSIA: 0
CONSTITUTIONAL NEGATIVE: 1
PSYCHIATRIC NEGATIVE: 1
APPETITE CHANGE: 0
CARDIOVASCULAR NEGATIVE: 1
DIARRHEA: 0
NAUSEA: 0
FLANK PAIN: 0
CONSTIPATION: 0
COUGH: 0
WHEEZING: 0
POLYPHAGIA: 0
DYSURIA: 0
FREQUENCY: 0
HEMATOLOGIC/LYMPHATIC NEGATIVE: 1
CONFUSION: 0
SEIZURES: 0
ADENOPATHY: 0

## 2019-11-18 NOTE — PROGRESS NOTES
[facility] at [facility]     Reason for visit:   Chief Complaint   Patient presents with   • Annual Exam      HPI  Sushant Hazel is a 49 y.o. male that presents for annual physical     Health Maintenance    Most Recent Labs: 2 yrs ago   Colonoscopy: denies family hx of colon ca - had one done 3/25/2015 d/t hemorrhoids  Flu Vaccine: got at work   Last Tetanus/Tdap: 5 yrs ago   Last eye exam: 2019   Last dentist appt: 6 months ago  Diet: eats healthy with lots of fruit and veges   Exercise: play qualifyor  Sexual Activity: yes  Partners: female  STD screening: declines          Review of Systems   Constitutional: Negative.  Negative for appetite change, fatigue and fever.   HENT: Negative.  Negative for congestion, ear pain, rhinorrhea and sore throat.    Eyes: Negative.  Negative for discharge, redness and visual disturbance.   Respiratory: Negative.  Negative for cough, chest tightness, shortness of breath and wheezing.    Cardiovascular: Negative.  Negative for chest pain, palpitations and leg swelling.   Gastrointestinal: Negative.  Negative for abdominal pain, constipation, diarrhea and nausea.   Endocrine: Negative.  Negative for polydipsia, polyphagia and polyuria.   Genitourinary: Negative.  Negative for dysuria, flank pain, frequency and urgency.   Musculoskeletal: Negative.  Negative for arthralgias, myalgias and neck pain.   Skin: Negative.  Negative for pallor and rash.   Allergic/Immunologic: Negative.  Negative for immunocompromised state.   Neurological: Negative.  Negative for dizziness, seizures and headaches.   Hematological: Negative.  Negative for adenopathy.   Psychiatric/Behavioral: Negative.  Negative for behavioral problems and confusion.        Patient Active Problem List   Diagnosis   • Cervical arthritis   • Rhinitis sicca   • Glaucoma suspect   • Mixed hyperlipidemia   • Mitral valve insufficiency         Past Medical History:   Diagnosis Date   • Kidney stones 06/2011   •  Lyme disease 04/2004     No past surgical history on file.  Social History     Socioeconomic History   • Marital status:      Spouse name: Not on file   • Number of children: Not on file   • Years of education: Not on file   • Highest education level: Not on file   Occupational History   • Not on file   Social Needs   • Financial resource strain: Not on file   • Food insecurity:     Worry: Not on file     Inability: Not on file   • Transportation needs:     Medical: Not on file     Non-medical: Not on file   Tobacco Use   • Smoking status: Never Smoker   • Smokeless tobacco: Never Used   Substance and Sexual Activity   • Alcohol use: Yes     Comment: Socially    • Drug use: No   • Sexual activity: Not on file   Lifestyle   • Physical activity:     Days per week: Not on file     Minutes per session: Not on file   • Stress: Not on file   Relationships   • Social connections:     Talks on phone: Not on file     Gets together: Not on file     Attends Sabianist service: Not on file     Active member of club or organization: Not on file     Attends meetings of clubs or organizations: Not on file     Relationship status: Not on file   • Intimate partner violence:     Fear of current or ex partner: Not on file     Emotionally abused: Not on file     Physically abused: Not on file     Forced sexual activity: Not on file   Other Topics Concern   • Not on file   Social History Narrative   • Not on file     Family History   Problem Relation Age of Onset   • Hyperlipidemia Biological Mother    • Hyperlipidemia Biological Father    • No Known Problems Biological Sister    • No Known Problems Biological Brother    • Prostate cancer Other    • Diabetes Other    • Hypertension Other        Allergies:  Patient has no known allergies.    Current Outpatient Medications   Medication Sig Dispense Refill   • cholecalciferol, vitamin D3, (VITAMIN D3) 1,000 unit capsule 2 capsules po once a day     • coenzyme Q10 (COQ10) 100 mg  "capsule Take 100 mg by mouth daily.     • magnesium hydroxide (M.O.M.) 400 mg/5 mL suspension Take 30 mL by mouth daily.       No current facility-administered medications for this visit.         Objective   Vitals:    11/18/19 1306   BP: 116/78   BP Location: Left upper arm   Patient Position: Sitting   Pulse: 78   Resp: 19   Temp: 36.7 °C (98 °F)   SpO2: 99%   Weight: 64.6 kg (142 lb 8 oz)   Height: 1.638 m (5' 4.5\")     Ht Readings from Last 3 Encounters:   11/18/19 1.638 m (5' 4.5\")   10/02/19 1.6 m (5' 3\")   10/24/18 1.6 m (5' 3\")     Wt Readings from Last 3 Encounters:   11/18/19 64.6 kg (142 lb 8 oz)   10/02/19 63.5 kg (140 lb)   10/24/18 60.8 kg (134 lb)     Body mass index is 24.08 kg/m².     Visual Acuity Screening    Right eye Left eye Both eyes   Without correction: 20/40 20/25 20/25   With correction:      Comments: Patient not color blind      Physical Exam   Constitutional: He is oriented to person, place, and time. Vital signs are normal. He appears well-developed and well-nourished. He is cooperative.   HENT:   Head: Normocephalic.   Right Ear: Hearing, tympanic membrane, external ear and ear canal normal.   Left Ear: Hearing, tympanic membrane, external ear and ear canal normal.   Eyes: Pupils are equal, round, and reactive to light. Conjunctivae, EOM and lids are normal.   Neck: Trachea normal and normal range of motion. Carotid bruit is not present. No thyroid mass and no thyromegaly present.   Cardiovascular: Normal rate, regular rhythm, S1 normal, S2 normal and normal heart sounds.   Pulses:       Radial pulses are 2+ on the right side, and 2+ on the left side.        Dorsalis pedis pulses are 2+ on the right side, and 2+ on the left side.   Pulmonary/Chest: Effort normal and breath sounds normal.   Abdominal: Soft. Normal appearance and bowel sounds are normal. There is no tenderness.   Musculoskeletal: Normal range of motion.   Lymphadenopathy:     He has no cervical adenopathy. "   Neurological: He is alert and oriented to person, place, and time. He has normal strength. No cranial nerve deficit or sensory deficit. GCS eye subscore is 4. GCS verbal subscore is 5. GCS motor subscore is 6.   Skin: Skin is warm, dry and intact.   Psychiatric: He has a normal mood and affect. His speech is normal and behavior is normal.            Point of Care Testing Results  Results for orders placed or performed in visit on 11/18/19   POCT urinalysis dipstick   Result Value Ref Range    Color, UA Yellow     Clarity, UA Clear     Glucose, UA Negative     Bilirubin, UA Negative     Ketones, UA Negative     Spec Grav, UA 0.015     Blood, UA Negative     pH, UA 7.0     Protein, UA Negative     Urobilinogen, UA 0.2     Leukocytes, UA Negative     Nitrite, UA Negative         Assessment   Diagnoses and all orders for this visit:    Annual physical exam (Primary)  -     CBC and Differential; Future  -     Comprehensive metabolic panel; Future  -     Lipid panel; Future  -     TSH 3rd Generation; Future    Routine medical exam  -     POCT urinalysis dipstick    Lab slip given for blood work.  Will call with results.  The patient has been in otherwise good general health in the past.        Patient has been educated on the risks, benefits, and side effects of all medication prescribed at this visit.  Patient expressed understanding and agreement with plan.  Return in about 1 year (around 11/18/2020).  DORA Jaimes  11/18/2019       There are no Patient Instructions on file for this visit.                   done

## 2019-11-27 ENCOUNTER — APPOINTMENT (OUTPATIENT)
Dept: LAB | Age: 49
End: 2019-11-27
Attending: NURSE PRACTITIONER
Payer: COMMERCIAL

## 2019-11-27 DIAGNOSIS — Z00.00 ANNUAL PHYSICAL EXAM: ICD-10-CM

## 2019-11-27 LAB
ALBUMIN SERPL-MCNC: 4.2 G/DL (ref 3.4–5)
ALP SERPL-CCNC: 59 IU/L (ref 35–126)
ALT SERPL-CCNC: 30 IU/L (ref 16–63)
ANION GAP SERPL CALC-SCNC: 5 MEQ/L (ref 3–15)
AST SERPL-CCNC: 23 IU/L (ref 15–41)
BASOPHILS # BLD: 0.02 K/UL (ref 0.01–0.1)
BASOPHILS NFR BLD: 0.6 %
BILIRUB SERPL-MCNC: 1.3 MG/DL (ref 0.3–1.2)
BUN SERPL-MCNC: 16 MG/DL (ref 8–20)
CALCIUM SERPL-MCNC: 9.5 MG/DL (ref 8.9–10.3)
CHLORIDE SERPL-SCNC: 104 MEQ/L (ref 98–109)
CHOLEST SERPL-MCNC: 226 MG/DL
CO2 SERPL-SCNC: 30 MEQ/L (ref 22–32)
CREAT SERPL-MCNC: 1.1 MG/DL
DIFFERENTIAL METHOD BLD: ABNORMAL
EOSINOPHIL # BLD: 0.07 K/UL (ref 0.04–0.54)
EOSINOPHIL NFR BLD: 1.9 %
ERYTHROCYTE [DISTWIDTH] IN BLOOD BY AUTOMATED COUNT: 11.9 % (ref 11.6–14.4)
GFR SERPL CREATININE-BSD FRML MDRD: >60 ML/MIN/1.73M*2
GLUCOSE SERPL-MCNC: 90 MG/DL (ref 70–99)
HCT VFR BLDCO AUTO: 49.2 % (ref 40.1–51)
HDLC SERPL-MCNC: 40 MG/DL
HDLC SERPL: 5.7 {RATIO}
HGB BLD-MCNC: 16.5 G/DL
IMM GRANULOCYTES # BLD AUTO: 0.01 K/UL (ref 0–0.08)
IMM GRANULOCYTES NFR BLD AUTO: 0.3 %
LDLC SERPL CALC-MCNC: 151 MG/DL
LYMPHOCYTES # BLD: 1.55 K/UL (ref 1.2–3.5)
LYMPHOCYTES NFR BLD: 42.8 %
MCH RBC QN AUTO: 31.3 PG (ref 28–33.2)
MCHC RBC AUTO-ENTMCNC: 33.5 G/DL (ref 32.2–36.5)
MCV RBC AUTO: 93.4 FL (ref 83–98)
MONOCYTES # BLD: 0.41 K/UL (ref 0.3–1)
MONOCYTES NFR BLD: 11.3 %
NEUTROPHILS # BLD: 1.56 K/UL (ref 1.7–7)
NEUTS SEG NFR BLD: 43.1 %
NONHDLC SERPL-MCNC: 186 MG/DL
NRBC BLD-RTO: 0 %
PDW BLD AUTO: 11.2 FL (ref 9.4–12.4)
PLAT MORPH BLD: NORMAL
PLATELET # BLD AUTO: 199 K/UL
PLATELET # BLD EST: ABNORMAL 10*3/UL
POTASSIUM SERPL-SCNC: 4.7 MEQ/L (ref 3.6–5.1)
PROT SERPL-MCNC: 6.5 G/DL (ref 6–8.2)
RBC # BLD AUTO: 5.27 M/UL (ref 4.5–5.8)
RBC MORPH BLD: NORMAL
SODIUM SERPL-SCNC: 139 MEQ/L (ref 136–144)
TRIGL SERPL-MCNC: 174 MG/DL (ref 30–149)
TSH SERPL DL<=0.05 MIU/L-ACNC: 0.9 MIU/L (ref 0.34–5.6)
WBC # BLD AUTO: 3.62 K/UL

## 2019-11-27 PROCEDURE — 36415 COLL VENOUS BLD VENIPUNCTURE: CPT

## 2019-11-27 PROCEDURE — 84443 ASSAY THYROID STIM HORMONE: CPT

## 2019-11-27 PROCEDURE — 85025 COMPLETE CBC W/AUTO DIFF WBC: CPT

## 2019-11-27 PROCEDURE — 83718 ASSAY OF LIPOPROTEIN: CPT

## 2019-11-27 PROCEDURE — 80053 COMPREHEN METABOLIC PANEL: CPT

## 2019-11-30 ENCOUNTER — TELEPHONE (OUTPATIENT)
Dept: FAMILY MEDICINE | Facility: CLINIC | Age: 49
End: 2019-11-30

## 2019-12-02 ENCOUNTER — TELEPHONE (OUTPATIENT)
Dept: FAMILY MEDICINE | Facility: CLINIC | Age: 49
End: 2019-12-02

## 2019-12-02 DIAGNOSIS — E78.5 HYPERLIPIDEMIA, UNSPECIFIED HYPERLIPIDEMIA TYPE: Primary | ICD-10-CM

## 2019-12-02 DIAGNOSIS — E78.2 MIXED HYPERLIPIDEMIA: Primary | ICD-10-CM

## 2019-12-02 RX ORDER — ROSUVASTATIN CALCIUM 10 MG/1
10 TABLET, COATED ORAL DAILY
Qty: 90 TABLET | Refills: 1 | Status: SHIPPED | OUTPATIENT
Start: 2019-12-02 | End: 2020-03-04 | Stop reason: SDUPTHER

## 2019-12-02 NOTE — TELEPHONE ENCOUNTER
----- Message from DORA Jaimes sent at 12/2/2019 10:57 AM EST -----  Please let pt know that his labs are back. Please let pt know that his/her cholesterol is elevated at 226 and LDL is elevated at 151.  He would benefit from starting on medication.  Would he be willing to start med if we sent it to pharmacy?  He also needs to watch their dietary fat intake and exercise more.  We will send him a rx to repeat Lipid Panel in 6 months and info on low cholesterol diet. Thanks!

## 2020-03-04 DIAGNOSIS — E78.2 MIXED HYPERLIPIDEMIA: ICD-10-CM

## 2020-03-04 RX ORDER — ROSUVASTATIN CALCIUM 10 MG/1
10 TABLET, COATED ORAL DAILY
Qty: 90 TABLET | Refills: 1 | Status: SHIPPED | OUTPATIENT
Start: 2020-03-04 | End: 2020-06-22 | Stop reason: SDUPTHER

## 2020-06-19 ENCOUNTER — TELEMEDICINE (OUTPATIENT)
Dept: OTOLARYNGOLOGY | Facility: CLINIC | Age: 50
End: 2020-06-19
Payer: COMMERCIAL

## 2020-06-19 VITALS — BODY MASS INDEX: 21.66 KG/M2 | HEIGHT: 65 IN | WEIGHT: 130 LBS

## 2020-06-19 DIAGNOSIS — J34.0 CELLULITIS OF NASAL TIP: Primary | ICD-10-CM

## 2020-06-19 DIAGNOSIS — J34.89 NASAL VESTIBULITIS: ICD-10-CM

## 2020-06-19 PROCEDURE — 99213 OFFICE O/P EST LOW 20 MIN: CPT | Mod: 95 | Performed by: OTOLARYNGOLOGY

## 2020-06-19 RX ORDER — MUPIROCIN 20 MG/G
OINTMENT TOPICAL
Qty: 22 G | Refills: 1 | Status: SHIPPED | OUTPATIENT
Start: 2020-06-19 | End: 2020-12-17 | Stop reason: ALTCHOICE

## 2020-06-19 RX ORDER — SULFAMETHOXAZOLE AND TRIMETHOPRIM 800; 160 MG/1; MG/1
TABLET ORAL
Qty: 10 TABLET | Refills: 0 | Status: SHIPPED | OUTPATIENT
Start: 2020-06-19 | End: 2020-06-22

## 2020-06-19 ASSESSMENT — ENCOUNTER SYMPTOMS
SHORTNESS OF BREATH: 0
HEADACHES: 0
DYSPHORIC MOOD: 0
FATIGUE: 0
PALPITATIONS: 0
WEAKNESS: 0
FEVER: 0
WHEEZING: 0
ADENOPATHY: 0
ARTHRALGIAS: 0
FREQUENCY: 0
DIZZINESS: 0
CONSTIPATION: 0
DIARRHEA: 0
WOUND: 1
RHINORRHEA: 0
POLYPHAGIA: 0
MYALGIAS: 0
NERVOUS/ANXIOUS: 0
VOICE CHANGE: 0
SINUS PRESSURE: 0
NAUSEA: 0
TROUBLE SWALLOWING: 0
COUGH: 0
SLEEP DISTURBANCE: 0
BACK PAIN: 0
VOMITING: 0

## 2020-06-19 ASSESSMENT — PAIN SCALES - GENERAL: PAINLEVEL: 0-NO PAIN

## 2020-06-19 NOTE — PROGRESS NOTES
Verification of Patient Location:  The patient affirms they are currently located in the following state: [place]    Request for Consent:   Audio Only Encounter   You and I are about to have a telemedicine check-in or visit. This is allowed because you have requested it. This telemedicine visit will be billed to your health insurance or you, if you are self-insured. You understand you will be responsible for any copayments or coinsurances that apply to your telemedicine visit. Before starting our telemedicine visit, I am required to get your consent for this virtual check-in or visit by telemedicine. Do you consent?    Patient Response to Request for Consent:  Yes    Visit Documentation:  Subjective     Patient ID: Sushant Hazel is a 50 y.o. male.   I confirmed that the patient's YOB: 1970.    Chief complaint: Left sided nasal pain and abscess    HPI   History of present illness:  Sushant has a history of rhinitis sicca.  He has never had facial cellulitis.  However, last week, the left side of his nostril turned red and became tender.  The skin thickened slightly and then after about 4 days of pain and inflammation, a pimple developed.  He began to put warm compresses on the pimple which brought it to a head.  As of yesterday, the redness and pain had resolved, however the thin skin covering the abscess broke down.  He is now worried that the infection will get worse.  He has not had a fever.  He does not have shooting pain in the nose nor does he have headaches.  He believes that wearing a mask every day led to irritation at the nasal tip.    Sushant has not had a cough, shortness of breath or wheezing.  He has not had any allergy symptoms.    Current medications: Coenzyme Q 10, Crestor, Ponaris and vitamin D3.     The following have been reviewed and updated as appropriate in this visit:  Tobacco  Allergies  Meds  Problems  Med Hx  Surg Hx  Fam Hx  Soc Hx        Review of  Systems   Constitutional: Negative for fatigue and fever.   HENT: Negative for congestion, hearing loss, nosebleeds, postnasal drip, rhinorrhea, sinus pressure, tinnitus, trouble swallowing and voice change.         Rhinitis sicca.   Eyes: Negative for visual disturbance.   Respiratory: Negative for cough, shortness of breath and wheezing.    Cardiovascular: Negative for chest pain and palpitations.   Gastrointestinal: Negative for constipation, diarrhea, nausea and vomiting.   Endocrine: Negative for polyphagia and polyuria.   Genitourinary: Negative for frequency.   Musculoskeletal: Negative for arthralgias, back pain, gait problem and myalgias.   Skin: Positive for rash and wound.   Allergic/Immunologic: Negative for environmental allergies.   Neurological: Negative for dizziness, weakness and headaches.   Hematological: Negative for adenopathy.   Psychiatric/Behavioral: Negative for dysphoric mood and sleep disturbance. The patient is not nervous/anxious.          Assessment/Plan    Impression:  1.  Localized facial cellulitis on the nasal dorsum.  2.  Nasal vestibulitis.  This type of infection is usually staph.    Recommendations and plan:  1.  Bactrim, 1 tablet twice daily for 5 days with plenty of water.  2.  Mupirocin ointment, applied to the outer nasal skin and to the mucosa of the nasal vestibules twice daily for 2 weeks.    Time Spent in Medical Discussion During This Encounter:      20 minutes         Anastasiia Medrano MD

## 2020-06-22 ENCOUNTER — TELEPHONE (OUTPATIENT)
Dept: FAMILY MEDICINE | Facility: CLINIC | Age: 50
End: 2020-06-22

## 2020-06-22 DIAGNOSIS — E78.2 MIXED HYPERLIPIDEMIA: ICD-10-CM

## 2020-06-22 DIAGNOSIS — E78.2 MIXED HYPERLIPIDEMIA: Primary | ICD-10-CM

## 2020-06-22 RX ORDER — ROSUVASTATIN CALCIUM 10 MG/1
10 TABLET, COATED ORAL DAILY
Qty: 90 TABLET | Refills: 1 | Status: SHIPPED | OUTPATIENT
Start: 2020-06-22 | End: 2020-12-16

## 2020-06-22 NOTE — TELEPHONE ENCOUNTER
Pt. States he is just concerned about being on a medication long term. I explained to him that he should remain on the medication until his labs are checked. Pt. Agreed. Labs ordered and pt. Scheduled for appt. For 7/6/20.    Please mail labs to pt.

## 2020-06-22 NOTE — TELEPHONE ENCOUNTER
Why does he want to stop crestor? He is overdue for labs to see how he is doing (check CMP Lipids) and is also should schedule an appt for this summer. Unless he is having a significant side effect, I don't think he should stop crestor without finding out what his labs look like. (Crestor does not need to be weaned)

## 2020-06-29 ENCOUNTER — TELEPHONE (OUTPATIENT)
Dept: OTOLARYNGOLOGY | Facility: CLINIC | Age: 50
End: 2020-06-29

## 2020-06-29 NOTE — TELEPHONE ENCOUNTER
He should probably see a dermatologist to see if this needs to be opened up.  I would recommend Dr. Rhonda Moreira at [facility].  
Informed pt.  
Pt has been using the mupirocin cream on and inside his nose for two weeks.  He shared the size of of the pimple have changed but its still there.  Pt would like to know is there anything else he could do?  Please advise!  
no

## 2020-07-14 ENCOUNTER — APPOINTMENT (RX ONLY)
Dept: URBAN - METROPOLITAN AREA CLINIC 28 | Facility: CLINIC | Age: 50
Setting detail: DERMATOLOGY
End: 2020-07-14

## 2020-07-14 DIAGNOSIS — L663 OTHER SPECIFIED DISEASES OF HAIR AND HAIR FOLLICLES: ICD-10-CM

## 2020-07-14 DIAGNOSIS — L738 OTHER SPECIFIED DISEASES OF HAIR AND HAIR FOLLICLES: ICD-10-CM

## 2020-07-14 DIAGNOSIS — L73.9 FOLLICULAR DISORDER, UNSPECIFIED: ICD-10-CM

## 2020-07-14 PROBLEM — L02.425 FURUNCLE OF RIGHT LOWER LIMB: Status: ACTIVE | Noted: 2020-07-14

## 2020-07-14 PROBLEM — L02.426 FURUNCLE OF LEFT LOWER LIMB: Status: ACTIVE | Noted: 2020-07-14

## 2020-07-14 PROBLEM — L02.423 FURUNCLE OF RIGHT UPPER LIMB: Status: ACTIVE | Noted: 2020-07-14

## 2020-07-14 PROBLEM — L02.424 FURUNCLE OF LEFT UPPER LIMB: Status: ACTIVE | Noted: 2020-07-14

## 2020-07-14 PROCEDURE — ? COUNSELING

## 2020-07-14 PROCEDURE — ? REASON FOR TELEMEDICINE VISIT

## 2020-07-14 PROCEDURE — 99202 OFFICE O/P NEW SF 15 MIN: CPT | Mod: 95

## 2020-07-14 PROCEDURE — ? PRESCRIPTION

## 2020-07-14 PROCEDURE — ? PRESCRIPTION MEDICATION MANAGEMENT

## 2020-07-14 RX ORDER — BENZOYL PEROXIDE 50 MG/ML
LIQUID TOPICAL QDAY
Qty: 1 | Refills: 3 | Status: ERX | COMMUNITY
Start: 2020-07-14

## 2020-07-14 RX ORDER — TRIAMCINOLONE ACETONIDE 0.25 MG/ML
LOTION TOPICAL QDAY
Qty: 1 | Refills: 3 | Status: ERX | COMMUNITY
Start: 2020-07-14

## 2020-07-14 RX ORDER — CLINDAMYCIN PHOSPHATE 10 MG/ML
LOTION TOPICAL QD
Qty: 1 | Refills: 3 | Status: ERX | COMMUNITY
Start: 2020-07-14

## 2020-07-14 RX ADMIN — CLINDAMYCIN PHOSPHATE: 10 LOTION TOPICAL at 00:00

## 2020-07-14 RX ADMIN — BENZOYL PEROXIDE: 50 LIQUID TOPICAL at 00:00

## 2020-07-14 RX ADMIN — TRIAMCINOLONE ACETONIDE: 0.25 LOTION TOPICAL at 00:00

## 2020-07-14 ASSESSMENT — LOCATION SIMPLE DESCRIPTION DERM
LOCATION SIMPLE: LEFT THIGH
LOCATION SIMPLE: RIGHT UPPER ARM
LOCATION SIMPLE: LEFT UPPER ARM
LOCATION SIMPLE: RIGHT THIGH

## 2020-07-14 ASSESSMENT — LOCATION DETAILED DESCRIPTION DERM
LOCATION DETAILED: RIGHT ANTERIOR DISTAL THIGH
LOCATION DETAILED: LEFT ANTERIOR PROXIMAL UPPER ARM
LOCATION DETAILED: LEFT ANTERIOR DISTAL THIGH
LOCATION DETAILED: RIGHT ANTERIOR DISTAL UPPER ARM

## 2020-07-14 ASSESSMENT — LOCATION ZONE DERM
LOCATION ZONE: ARM
LOCATION ZONE: LEG

## 2020-07-14 NOTE — PROCEDURE: PRESCRIPTION MEDICATION MANAGEMENT
Detail Level: Zone
Initiate Treatment: benzoyl peroxide 5% topical cleanser: Wash aa of body qday in shower\\nclindamycin 1% lotion: Apply to aa of body qday after shower\\ntriamcinolone acetonide 0.025% lotion: apply to aa of body qday for itch
Render In Strict Bullet Format?: No

## 2020-09-07 ENCOUNTER — APPOINTMENT (EMERGENCY)
Dept: RADIOLOGY | Facility: HOSPITAL | Age: 50
End: 2020-09-07
Attending: EMERGENCY MEDICINE
Payer: COMMERCIAL

## 2020-09-07 ENCOUNTER — HOSPITAL ENCOUNTER (OUTPATIENT)
Facility: CLINIC | Age: 50
Discharge: HOME | End: 2020-09-07
Attending: EMERGENCY MEDICINE
Payer: COMMERCIAL

## 2020-09-07 ENCOUNTER — HOSPITAL ENCOUNTER (EMERGENCY)
Facility: HOSPITAL | Age: 50
Discharge: HOME | End: 2020-09-07
Attending: EMERGENCY MEDICINE
Payer: COMMERCIAL

## 2020-09-07 VITALS
HEART RATE: 67 BPM | BODY MASS INDEX: 22.2 KG/M2 | SYSTOLIC BLOOD PRESSURE: 172 MMHG | TEMPERATURE: 98.9 F | DIASTOLIC BLOOD PRESSURE: 90 MMHG | HEIGHT: 64 IN | OXYGEN SATURATION: 99 % | RESPIRATION RATE: 21 BRPM | WEIGHT: 130 LBS

## 2020-09-07 VITALS
WEIGHT: 130 LBS | HEART RATE: 66 BPM | TEMPERATURE: 98 F | BODY MASS INDEX: 21.97 KG/M2 | SYSTOLIC BLOOD PRESSURE: 118 MMHG | DIASTOLIC BLOOD PRESSURE: 78 MMHG | OXYGEN SATURATION: 99 %

## 2020-09-07 DIAGNOSIS — R07.89 CHEST TIGHTNESS: Primary | ICD-10-CM

## 2020-09-07 DIAGNOSIS — R07.9 CHEST PAIN, UNSPECIFIED TYPE: Primary | ICD-10-CM

## 2020-09-07 LAB
ALBUMIN SERPL-MCNC: 4.6 G/DL (ref 3.4–5)
ALP SERPL-CCNC: 58 IU/L (ref 35–126)
ALT SERPL-CCNC: 21 IU/L (ref 16–63)
ANION GAP SERPL CALC-SCNC: 6 MEQ/L (ref 3–15)
AST SERPL-CCNC: 21 IU/L (ref 15–41)
BASOPHILS # BLD: 0.01 K/UL (ref 0.01–0.1)
BASOPHILS NFR BLD: 0.3 %
BILIRUB SERPL-MCNC: 1.1 MG/DL (ref 0.3–1.2)
BUN SERPL-MCNC: 21 MG/DL (ref 8–20)
CALCIUM SERPL-MCNC: 8.9 MG/DL (ref 8.9–10.3)
CHLORIDE SERPL-SCNC: 107 MEQ/L (ref 98–109)
CO2 SERPL-SCNC: 25 MEQ/L (ref 22–32)
CREAT SERPL-MCNC: 1 MG/DL (ref 0.8–1.3)
DIFFERENTIAL METHOD BLD: ABNORMAL
EOSINOPHIL # BLD: 0.04 K/UL (ref 0.04–0.54)
EOSINOPHIL NFR BLD: 1.2 %
ERYTHROCYTE [DISTWIDTH] IN BLOOD BY AUTOMATED COUNT: 11.6 % (ref 11.6–14.4)
GFR SERPL CREATININE-BSD FRML MDRD: >60 ML/MIN/1.73M*2
GLUCOSE SERPL-MCNC: 101 MG/DL (ref 70–99)
HCT VFR BLDCO AUTO: 45.3 % (ref 40.1–51)
HGB BLD-MCNC: 14.8 G/DL (ref 13.7–17.5)
IMM GRANULOCYTES # BLD AUTO: 0.01 K/UL (ref 0–0.08)
IMM GRANULOCYTES NFR BLD AUTO: 0.3 %
LYMPHOCYTES # BLD: 1.38 K/UL (ref 1.2–3.5)
LYMPHOCYTES NFR BLD: 42.1 %
MCH RBC QN AUTO: 30.9 PG (ref 28–33.2)
MCHC RBC AUTO-ENTMCNC: 32.7 G/DL (ref 32.2–36.5)
MCV RBC AUTO: 94.6 FL (ref 83–98)
MONOCYTES # BLD: 0.32 K/UL (ref 0.3–1)
MONOCYTES NFR BLD: 9.8 %
NEUTROPHILS # BLD: 1.52 K/UL (ref 1.7–7)
NEUTS SEG NFR BLD: 46.3 %
NRBC BLD-RTO: 0 %
PDW BLD AUTO: 10.8 FL (ref 9.4–12.4)
PLATELET # BLD AUTO: 155 K/UL (ref 150–350)
POTASSIUM SERPL-SCNC: 4.1 MEQ/L (ref 3.6–5.1)
PROT SERPL-MCNC: 7.1 G/DL (ref 6–8.2)
RBC # BLD AUTO: 4.79 M/UL (ref 4.5–5.8)
SODIUM SERPL-SCNC: 138 MEQ/L (ref 136–144)
TROPONIN I SERPL-MCNC: <0.02 NG/ML
WBC # BLD AUTO: 3.28 K/UL (ref 3.8–10.5)

## 2020-09-07 PROCEDURE — 84484 ASSAY OF TROPONIN QUANT: CPT | Performed by: PHYSICIAN ASSISTANT

## 2020-09-07 PROCEDURE — 71045 X-RAY EXAM CHEST 1 VIEW: CPT

## 2020-09-07 PROCEDURE — 85025 COMPLETE CBC W/AUTO DIFF WBC: CPT | Performed by: PHYSICIAN ASSISTANT

## 2020-09-07 PROCEDURE — 80053 COMPREHEN METABOLIC PANEL: CPT | Performed by: PHYSICIAN ASSISTANT

## 2020-09-07 PROCEDURE — 93005 ELECTROCARDIOGRAM TRACING: CPT | Performed by: PHYSICIAN ASSISTANT

## 2020-09-07 PROCEDURE — 99213 OFFICE O/P EST LOW 20 MIN: CPT | Performed by: EMERGENCY MEDICINE

## 2020-09-07 PROCEDURE — 63700000 HC SELF-ADMINISTRABLE DRUG: Performed by: PHYSICIAN ASSISTANT

## 2020-09-07 PROCEDURE — 99283 EMERGENCY DEPT VISIT LOW MDM: CPT | Mod: 25

## 2020-09-07 PROCEDURE — 36415 COLL VENOUS BLD VENIPUNCTURE: CPT

## 2020-09-07 PROCEDURE — 93000 ELECTROCARDIOGRAM COMPLETE: CPT | Performed by: EMERGENCY MEDICINE

## 2020-09-07 PROCEDURE — S9083 URGENT CARE CENTER GLOBAL: HCPCS | Performed by: EMERGENCY MEDICINE

## 2020-09-07 RX ORDER — NAPROXEN SODIUM 220 MG/1
324 TABLET, FILM COATED ORAL ONCE
Status: COMPLETED | OUTPATIENT
Start: 2020-09-07 | End: 2020-09-07

## 2020-09-07 RX ADMIN — ASPIRIN 81 MG 324 MG: 81 TABLET ORAL at 13:18

## 2020-09-07 ASSESSMENT — ENCOUNTER SYMPTOMS
SHORTNESS OF BREATH: 1
DIAPHORESIS: 0
CHILLS: 0
LIGHT-HEADEDNESS: 1
COUGH: 0
DIARRHEA: 0
DIZZINESS: 1
NECK PAIN: 0
DYSURIA: 0
SINUS PAIN: 0
DIZZINESS: 0
LIGHT-HEADEDNESS: 1
SYNCOPE: 0
NAUSEA: 0
WEAKNESS: 0
VOMITING: 0
FATIGUE: 0
HEMATURIA: 0
NEAR-SYNCOPE: 0
HEADACHES: 0
HEARTBURN: 1
CHEST TIGHTNESS: 1
COLOR CHANGE: 0
ABDOMINAL PAIN: 0
BACK PAIN: 0
FEVER: 0
SHORTNESS OF BREATH: 1
BACK PAIN: 0
NAUSEA: 0
FEVER: 0
ABDOMINAL PAIN: 0
PALPITATIONS: 0

## 2020-09-07 NOTE — ED ATTESTATION NOTE
Procedures  Physical Exam  Review of Systems    9/7/20201:06 PM  I have personally seen and examined the patient. I have reviewed and agree with the PA/NP/Resident's assessment and ED plan of care. My examination, assessment and plan of care of Sushant Hazel is as follows:    Patient is a 50-year-old male who presents with chest discomfort, patient reports onset was Saturday and he has felt the discomfort since then, patient has no history of coronary artery disease that he is aware of, does report a coronary artery disease history in his family, no fever or cough reported    Exam:   Vital signs have been reviewed, pulse ox is 99% on room air, normal  Heart: RRR, normal S1/S2  Lungs: CTA bilaterally  Abdo: soft, non-tender    Plan: Patient is a 50-year-old male who presents with chest discomfort, EKG does not show acute injury pattern, checking labs and imaging, reevaluate afterwards          Godshall, Duane K, MD  09/07/20 3011     84

## 2020-09-07 NOTE — ED PROVIDER NOTES
History  Chief Complaint   Patient presents with   • Chest Pain     Nonsmoker  Started with same tightness weeks ago, resolved with rest.  Did not have pain investigated.  Saturday, was doing High intensity workout- started with chest tightness, dyspnea, lightheadness- resolved with rest  Hx MVP, hyperlipidemia  FHX- grandfather CAD 70s      History provided by:  Patient   used: No    Chest Pain   Pain location:  L chest  Pain quality: tightness    Pain radiates to:  Does not radiate  Pain severity:  Moderate  Onset quality:  Gradual  Duration:  2 days  Timing:  Intermittent  Progression:  Waxing and waning  Chronicity:  New  Context comment:  Exertion  Relieved by:  Rest  Worsened by:  Exertion  Ineffective treatments:  None tried  Associated symptoms: dizziness, heartburn and shortness of breath    Associated symptoms: no abdominal pain, no anxiety, no back pain, no cough, no diaphoresis, no fever, no nausea, no near-syncope, no palpitations, no syncope and no weakness    Risk factors: high cholesterol        Past Medical History:   Diagnosis Date   • Kidney stones 06/2011   • Lyme disease 04/2004       No past surgical history on file.    Family History   Problem Relation Age of Onset   • Hyperlipidemia Biological Mother    • Hyperlipidemia Biological Father    • No Known Problems Biological Sister    • No Known Problems Biological Brother    • Prostate cancer Other    • Diabetes Other    • Hypertension Other        Social History     Tobacco Use   • Smoking status: Never Smoker   • Smokeless tobacco: Never Used   Substance Use Topics   • Alcohol use: Yes     Alcohol/week: 2.0 - 4.0 standard drinks     Types: 2 - 4 Standard drinks or equivalent per week     Comment: Socially    • Drug use: No       Review of Systems   Constitutional: Negative for diaphoresis and fever.   Respiratory: Positive for shortness of breath. Negative for cough.    Cardiovascular: Positive for chest pain. Negative for  palpitations, syncope and near-syncope.   Gastrointestinal: Positive for heartburn. Negative for abdominal pain and nausea.   Musculoskeletal: Negative for back pain.   Neurological: Positive for dizziness and light-headedness. Negative for weakness.       Physical Exam  ED Triage Vitals [09/07/20 1057]   Temp Heart Rate Resp BP SpO2   36.7 °C (98 °F) 66 -- 118/78 99 %      Temp src Heart Rate Source Patient Position BP Location FiO2 (%) (Set)   -- -- -- -- --       Physical Exam   Constitutional: He appears well-developed and well-nourished.   HENT:   Head: Normocephalic and atraumatic.   Eyes: Pupils are equal, round, and reactive to light.   Neck: Normal range of motion.   Cardiovascular: Normal rate, regular rhythm and normal pulses.      No systolic murmur is present.  Pulmonary/Chest: Effort normal and breath sounds normal.   Musculoskeletal: Normal range of motion.        Right lower leg: Normal. He exhibits no tenderness and no edema.        Left lower leg: Normal. He exhibits no tenderness and no edema.   Skin: Skin is warm. Capillary refill takes less than 2 seconds.         Procedures  Procedures     Course  Clinical Impressions as of Sep 07 1130   Chest tightness       MDM  Number of Diagnoses or Management Options  Chest tightness:   Diagnosis management comments: EKG in - nonischemic  Pt referred to Ed for labs/CXR-staff notified  Staff at [facility] notified                 Rosalva Hunt, DO  09/07/20 1121       Rosalva Hunt, DO  09/07/20 1130

## 2020-09-07 NOTE — ED PROVIDER NOTES
HPI     Chief Complaint   Patient presents with   • Chest Pain       50 year old M with pmhx of HLD and mitral valve prolapse presents complaining of 2 episodes of exertional chest tightness.  The first episode occurred 3 weeks ago while jumping rope.  He admits to associated dyspnea and lightheadedness during the episode.  Patient rested and his symptoms resolved in about 5 minutes.  He had another episode 3 days ago while playing badminton.  He also noted dyspnea and lightheadedness during the second episode.  Patient does admit to being diaphoretic during the episode, however, states he was exerting himself and was diaphoretic prior to symptom onset.  He denies radiation of pain, jaw/arm pain, nausea, vomiting, or syncope.  Patient rested and his symptoms again resolved.  He had a normal stress test 4 years ago.  He follows with Dr. Johnson.  No first degree family hx of CAD. No recent long travel, recent surgery, hemoptysis, unilateral calf pain/swelling, supplemental hormone use, known active malignancy, personal/family history of DVT/PE.  No recent fever/chills, cough, congestion, myalgias.           Patient History     Past Medical History:   Diagnosis Date   • Hyperlipemia    • Kidney stones 06/2011   • Lyme disease 04/2004   • Mitral valve prolapse        History reviewed. No pertinent surgical history.    Family History   Problem Relation Age of Onset   • Hyperlipidemia Biological Mother    • Hyperlipidemia Biological Father    • No Known Problems Biological Sister    • No Known Problems Biological Brother    • Prostate cancer Other    • Diabetes Other    • Hypertension Other        Social History     Tobacco Use   • Smoking status: Never Smoker   • Smokeless tobacco: Never Used   Substance Use Topics   • Alcohol use: Yes     Alcohol/week: 2.0 - 4.0 standard drinks     Types: 2 - 4 Standard drinks or equivalent per week     Comment: Socially    • Drug use: No       Systems Reviewed from Nursing Triage:      "     Review of Systems     Review of Systems   Constitutional: Negative for chills, fatigue and fever.   HENT: Negative for congestion and sinus pain.    Respiratory: Positive for chest tightness and shortness of breath.    Cardiovascular: Positive for chest pain.   Gastrointestinal: Negative for abdominal pain, diarrhea, nausea and vomiting.   Genitourinary: Negative for dysuria and hematuria.   Musculoskeletal: Negative for back pain and neck pain.   Skin: Negative for color change, pallor and rash.   Neurological: Positive for light-headedness. Negative for dizziness, syncope and headaches.        Physical Exam     ED Triage Vitals [09/07/20 1229]   Temp Heart Rate Resp BP SpO2   37.2 °C (98.9 °F) 76 18 140/84 99 %      Temp Source Heart Rate Source Patient Position BP Location FiO2 (%) (Set)   Temporal -- Lying Left upper arm --                     Patient Vitals for the past 24 hrs:   BP Temp Temp src Pulse Resp SpO2 Height Weight   09/07/20 1229 140/84 37.2 °C (98.9 °F) Temporal 76 18 99 % 1.626 m (5' 4\") 59 kg (130 lb)                                          Physical Exam   Constitutional: He is oriented to person, place, and time. He appears well-developed and well-nourished. No distress.   HENT:   Head: Normocephalic and atraumatic.   Eyes: Conjunctivae and EOM are normal.   Neck: Normal range of motion. Neck supple.   Cardiovascular: Normal rate, regular rhythm and normal heart sounds.   Pulmonary/Chest: Effort normal and breath sounds normal.   Musculoskeletal: Normal range of motion. He exhibits no deformity.   No LE edema. Calves are symmetrical in appearance without any erythema, tenderness, or palpable cords.    Neurological: He is alert and oriented to person, place, and time.   Skin: Skin is warm and dry. He is not diaphoretic.   Psychiatric: He has a normal mood and affect. His behavior is normal.   Nursing note and vitals reviewed.           ECG 12 lead  Date/Time: 9/7/2020 2:05 PM  Performed by: " Mally Haas PA C  Authorized by: Emergency, Triage Protocol, MD     ECG reviewed by ED Physician in the absence of a cardiologist: yes    Previous ECG:     Previous ECG:  Compared to current    Comparison ECG info:  8/31/2018    Similarity:  No change  Interpretation:     Interpretation: normal    Rate:     ECG rate:  75    ECG rate assessment: normal    Rhythm:     Rhythm: sinus rhythm    Ectopy:     Ectopy: PAC    QRS:     QRS axis:  Normal    QRS intervals:  Normal  Conduction:     Conduction: normal    ST segments:     ST segments:  Normal  T waves:     T waves: normal          Labs Reviewed   CBC AND DIFF   COMPREHENSIVE METABOLIC PANEL   TROPONIN I   RAINBOW DRAW PANEL    Narrative:     The following orders were created for panel order [place] Draw Panel.  Procedure                               Abnormality         Status                     ---------                               -----------         ------                     RAINBOW RED[[id number]]                                                                 RAINBOW LAVENDER[[id number]]                                                            RAINBOW LT BLUE[[id number]]                                                             RAINBOW GOLD[[id number]]                                                                  Please view results for these tests on the individual orders.   RAINBOW RED   RAINBOW LAVENDER   RAINBOW LT BLUE   RAINBOW GOLD       ECG 12 lead    (Results Pending)               ED Course & MDM     MDM         ED Course as of Sep 07 1403   Mon Sep 07, 2020   1231 Impression: exertional chest tightness; two episodes; most recent 9/5/2020 Plan: IV, EKG, CXR, labs, trop, ASA x4, cardiac monitoring PERC score of 0     [CW]   1316 Troponin I: <0.02 [CW]   1341 IMPRESSION: No acute cardiopulmonary disease   X-RAY CHEST 1 VIEW [CW]   1357 HEART score of 2. EKG, CXR, and troponin WNL.  Patient instructed to call his cardiologist tomorrow  to schedule close follow-up of the symptoms.  He has been given instructions and return precautions.  Patient verbalizes understanding and agrees with plan.    [CW]      ED Course User Index  [CW] Mally Haas PA C         Clinical Impressions as of Sep 07 1403   Chest pain, unspecified type        Mally Haas PA C  09/07/20 1401

## 2020-09-07 NOTE — DISCHARGE INSTRUCTIONS
Your EKG, chest x-ray, and cardiac enzymes are all within normal limits.  Please call your cardiologist tomorrow to schedule close follow-up of the symptoms.  Rest at home and avoid heavy exercises.  Seek emergent medical attention for worsening symptoms, trouble breathing, or any other new/concerning symptoms.

## 2020-09-08 LAB
ATRIAL RATE: 75
P AXIS: 76
PR INTERVAL: 122
QRS DURATION: 84
QT INTERVAL: 362
QTC CALCULATION(BAZETT): 404
R AXIS: 48
T WAVE AXIS: 46
VENTRICULAR RATE: 75

## 2020-09-08 PROCEDURE — 93010 ELECTROCARDIOGRAM REPORT: CPT | Performed by: INTERNAL MEDICINE

## 2020-09-10 ENCOUNTER — OFFICE VISIT (OUTPATIENT)
Dept: CARDIOLOGY | Facility: CLINIC | Age: 50
End: 2020-09-10
Payer: COMMERCIAL

## 2020-09-10 VITALS
BODY MASS INDEX: 21.97 KG/M2 | SYSTOLIC BLOOD PRESSURE: 148 MMHG | DIASTOLIC BLOOD PRESSURE: 78 MMHG | OXYGEN SATURATION: 97 % | WEIGHT: 128 LBS | HEART RATE: 69 BPM

## 2020-09-10 DIAGNOSIS — E78.2 MIXED HYPERLIPIDEMIA: ICD-10-CM

## 2020-09-10 DIAGNOSIS — R03.0 ELEVATED BLOOD PRESSURE READING: ICD-10-CM

## 2020-09-10 DIAGNOSIS — I34.0 NONRHEUMATIC MITRAL VALVE REGURGITATION: ICD-10-CM

## 2020-09-10 DIAGNOSIS — R07.89 OTHER CHEST PAIN: Primary | ICD-10-CM

## 2020-09-10 PROCEDURE — 99214 OFFICE O/P EST MOD 30 MIN: CPT | Performed by: INTERNAL MEDICINE

## 2020-09-10 RX ORDER — OMEPRAZOLE 20 MG/1
20 CAPSULE, DELAYED RELEASE ORAL
Qty: 90 CAPSULE | Refills: 0 | Status: SHIPPED | OUTPATIENT
Start: 2020-09-10 | End: 2020-12-10

## 2020-09-10 ASSESSMENT — ENCOUNTER SYMPTOMS
DYSPNEA ON EXERTION: 1
SHORTNESS OF BREATH: 0
VERTIGO: 0
ALTERED MENTAL STATUS: 0
LIGHT-HEADEDNESS: 0
FEVER: 0
HOARSE VOICE: 0
NAIL CHANGES: 0
IRREGULAR HEARTBEAT: 0
HEMATEMESIS: 0
JAUNDICE: 0
DIZZINESS: 1
ABDOMINAL PAIN: 0
CLAUDICATION: 0
NERVOUS/ANXIOUS: 0
DYSURIA: 0
INSOMNIA: 0
DIAPHORESIS: 0
WEAKNESS: 0
HEMATURIA: 0
WHEEZING: 0
SPUTUM PRODUCTION: 0
MYALGIAS: 0
HEMOPTYSIS: 0
BLURRED VISION: 0
SYNCOPE: 0
PALPITATIONS: 0
HEMATOCHEZIA: 0
NEAR-SYNCOPE: 0
ORTHOPNEA: 0
COLOR CHANGE: 0
PND: 0

## 2020-09-10 NOTE — ASSESSMENT & PLAN NOTE
"-2 episodes of CP over the weekend, one while playing bad mitten, describes clamping type discomfort  -The other has been off and on \"heart burn\" type feeling which is still present  -ED work up with CXR, EKG trops unremarkable    Unlikely ACS.  Will trial PPI for 8 weeks to see if symptoms dissipate.  Check stress echo for completeness as risk factors include elevated blood pressure, elevated LDL and family hx of CAD  "

## 2020-09-10 NOTE — ASSESSMENT & PLAN NOTE
9/10/2020 1334 9/10/2020 1344   /89 (A) 148/78 (A)     He is quite anxious on exam.  Had elevated blood pressures when in ED as well.  He has cuff at home.  Reviewed to bring cuff in next visit and we can re-evaluate and he can check at home when more calm if accurate cuff.

## 2020-09-10 NOTE — ASSESSMENT & PLAN NOTE
Component      Latest Ref Rng & Units 11/27/2019   Triglycerides      30 - 149 mg/dL 174 (H)   Cholesterol      <=200 mg/dL 226 (H)   HDL      >=45 mg/dL 40 (L)   LDL Calculated      <=100 mg/dL 151 (H)   Non-HDL, Calculated      mg/dL 186   RISK      <=5.0 5.7 (H)     Recently placed on statin by PCP with plans for re-check in a few months.  Reviewed this is likely genetic given he is thin and with good diet.

## 2020-09-10 NOTE — PROGRESS NOTES
"CARDIOLOGY OUTPATIENT PROGRESS NOTE    Sushant Hazel is a 50 y.o. male  who presents with ongoing cardiac care and follow up from recent ED visit.    Summary: Established care in 2018 following hospitalization for chest discomfort, cardiac work up negative, CA score in 2017 0, mild mitral insufficiency, EF 70% 2017, HLD with  2019.    Interval hx: he tells me that on Saturday night he was playing [email or web address] when he experienced \"clamping tightness\" in his chest.  This dissipated once he stopped playing.  He states since then he has experienced some dizziness and SOB as well.  On Sunday night he experienced a different type of chest discomfort.  Describes more of an intermittent \"heart burn type feeling.\"  No obvious aggravating or alleviating factors.  Has no tried antacid.  He initially went to urgent care who suggested he have work up at [facility].  EKG, CXR and troponin unremarkable.    Today on exam blood pressure is elevated however he is quite anxious.  Most recent lab work reviewed with relevant values outlined below.        The following have been reviewed and updated as appropriate in this visit:  Tobacco  Allergies  Meds  Problems  Med Hx  Surg Hx  Fam Hx  Soc Hx      Past Medical History:   Diagnosis Date   • Hyperlipemia    • Kidney stones 06/2011   • Lyme disease 04/2004   • Mitral valve prolapse        : History reviewed. No pertinent surgical history.    ALLERGIES   Patient has no known allergies.    Current Outpatient Medications   Medication Sig Dispense Refill   • cholecalciferol, vitamin D3, (VITAMIN D3) 1,000 unit capsule 2 capsules po once a day     • coenzyme Q10 (COQ10) 100 mg capsule Take 100 mg by mouth daily.     • mupirocin (BACTROBAN) 2 % ointment Apply into left nostril and on outside of nose with a Q-tip twice daily for 10 days. 22 g 1   • rosuvastatin (CRESTOR) 10 mg tablet Take 1 tablet (10 mg total) by mouth daily. 90 tablet 1   • omeprazole (PriLOSEC) 20 mg " capsule Take 1 capsule (20 mg total) by mouth daily before breakfast. 90 capsule 0     No current facility-administered medications for this visit.           Social History     Socioeconomic History   • Marital status:      Spouse name: None   • Number of children: None   • Years of education: None   • Highest education level: None   Occupational History   • None   Social Needs   • Financial resource strain: None   • Food insecurity:     Worry: None     Inability: None   • Transportation needs:     Medical: None     Non-medical: None   Tobacco Use   • Smoking status: Never Smoker   • Smokeless tobacco: Never Used   Substance and Sexual Activity   • Alcohol use: Yes     Alcohol/week: 2.0 - 4.0 standard drinks     Types: 2 - 4 Standard drinks or equivalent per week     Comment: Socially    • Drug use: No   • Sexual activity: Yes     Partners: Female   Lifestyle   • Physical activity:     Days per week: None     Minutes per session: None   • Stress: None   Relationships   • Social connections:     Talks on phone: None     Gets together: None     Attends [employer] service: None     Active member of club or organization: None     Attends meetings of clubs or organizations: None     Relationship status: None   • Intimate partner violence:     Fear of current or ex partner: None     Emotionally abused: None     Physically abused: None     Forced sexual activity: None   Other Topics Concern   • None   Social History Narrative   • None          Family History   Problem Relation Age of Onset   • Hyperlipidemia Biological Mother    • Hyperlipidemia Biological Father    • No Known Problems Biological Sister    • No Known Problems Biological Brother    • Prostate cancer Other    • Diabetes Other    • Hypertension Other        Review of Systems   Constitution: Negative for diaphoresis, fever and malaise/fatigue.   HENT: Negative for hoarse voice and nosebleeds.    Eyes: Negative for blurred vision and visual disturbance.    Cardiovascular: Positive for chest pain and dyspnea on exertion. Negative for claudication, cyanosis, irregular heartbeat, leg swelling, near-syncope, orthopnea, palpitations, paroxysmal nocturnal dyspnea and syncope.   Respiratory: Negative for hemoptysis, shortness of breath, sputum production and wheezing.    Endocrine: Negative for cold intolerance and heat intolerance.   Hematologic/Lymphatic: Negative for bleeding problem.   Skin: Negative for color change and nail changes.   Musculoskeletal: Negative for muscle weakness and myalgias.   Gastrointestinal: Negative for abdominal pain, hematemesis, hematochezia and jaundice.   Genitourinary: Negative for dysuria and hematuria.   Neurological: Positive for dizziness. Negative for light-headedness, vertigo and weakness.   Psychiatric/Behavioral: Negative for altered mental status. The patient does not have insomnia and is not nervous/anxious.        Objective     Visit Vitals  BP (!) 148/78 (BP Location: Right upper arm, Patient Position: Sitting)   Pulse 69   Wt 58.1 kg (128 lb)   SpO2 97%   BMI 21.97 kg/m²       Wt Readings from Last 3 Encounters:   09/10/20 58.1 kg (128 lb)   09/07/20 59 kg (130 lb)   09/07/20 59 kg (130 lb)       Physical Exam   Constitutional: He is oriented to person, place, and time. He appears well-developed and well-nourished. No distress.   HENT:   Head: Normocephalic.   Neck: Normal range of motion.   Cardiovascular: Normal rate, regular rhythm and normal heart sounds.   Pulmonary/Chest: Breath sounds normal. No respiratory distress. He has no wheezes. He exhibits no tenderness.   Abdominal: Soft. He exhibits no distension. There is no tenderness.   Musculoskeletal: Normal range of motion.   Neurological: He is alert and oriented to person, place, and time.   Skin: Skin is warm and dry. He is not diaphoretic.   Psychiatric: He has a normal mood and affect.        LABS  Lab Results   Component Value Date    WBC 3.28 (L) 09/07/2020     "HGB 14.8 2020    HCT 45.3 2020     2020    CHOL 226 (H) 2019    TRIG 174 (H) 2019    LDLCALC 151 (H) 2019    HDL 40 (L) 2019    ALT 21 2020    AST 21 2020     2020    K 4.1 2020     2020    CREATININE 1.0 2020    BUN 21 (H) 2020    CO2 25 2020    TSH 0.90 2019    GLUCOSE 101 (H) 2020    TROPONINI <0.02 2020       IMAGING/PROCEDURES  X-RAY CHEST 1 VIEW 20  IMPRESSION:  No acute cardiopulmonary disease        ECG EK20      Problem List Items Addressed This Visit        Nervous    Other chest pain - Primary    Current Assessment & Plan     -2 episodes of CP over the weekend, one while playing bad mitten, describes clamping type discomfort  -The other has been off and on \"heart burn\" type feeling which is still present  -ED work up with CXR, EKG trops unremarkable    Unlikely ACS.  Will trial PPI for 8 weeks to see if symptoms dissipate.  Check stress echo for completeness as risk factors include elevated blood pressure, elevated LDL and family hx of CAD         Relevant Orders    Echocardiogram stress test       Circulatory    Mitral valve insufficiency    Overview     Overview:  mild. Follows with Dr Johnson every 3-4 years         Current Assessment & Plan     This will be evaluated when he returns for stress echo in a few weeks.          Relevant Orders    Echocardiogram stress test    Elevated blood pressure reading    Current Assessment & Plan         9/10/2020 1334 9/10/2020 1344   /89 (A) 148/78 (A)     He is quite anxious on exam.  Had elevated blood pressures when in ED as well.  He has cuff at home.  Reviewed to bring cuff in next visit and we can re-evaluate and he can check at home when more calm if accurate cuff.             Endocrine/Metabolic    Mixed hyperlipidemia    Overview     Overview:  calcium score 0         Current Assessment & Plan     " Component      Latest Ref Rng & Units 11/27/2019   Triglycerides      30 - 149 mg/dL 174 (H)   Cholesterol      <=200 mg/dL 226 (H)   HDL      >=45 mg/dL 40 (L)   LDL Calculated      <=100 mg/dL 151 (H)   Non-HDL, Calculated      mg/dL 186   RISK      <=5.0 5.7 (H)     Recently placed on statin by PCP with plans for re-check in a few months.  Reviewed this is likely genetic given he is thin and with good diet.         Relevant Orders    Echocardiogram stress test          IJaney, am scribing for, and in the presence of Khio Johnson DO.  IKhoi DO, personally performed the services described in this documentation as scribed by Janey Sanches in my presence, and it is both accurate and complete.    This patient note has been dictated using speech recognition software. Inadvertent speech recognition errors should be disregarded. Please do not hesitate to call my office for clarifications.    DORA Godoy  9/10/2020 12:51 PM

## 2020-09-10 NOTE — PATIENT INSTRUCTIONS
1. Take omeprazole for 8 weeks    2. If you need something for heartburn immediately take TUMS or Pepcid    3. Bring your home blood pressure cuff to next visit

## 2020-09-18 ENCOUNTER — TELEPHONE (OUTPATIENT)
Dept: CARDIOLOGY | Facility: CLINIC | Age: 50
End: 2020-09-18

## 2020-09-18 NOTE — TELEPHONE ENCOUNTER
Patient needs pre auth for stress echo 9/30/2020 [facility] [address] [phone].

## 2020-09-30 ENCOUNTER — DOCUMENTATION (OUTPATIENT)
Dept: CARDIOLOGY | Facility: CLINIC | Age: 50
End: 2020-09-30

## 2020-09-30 ENCOUNTER — HOSPITAL ENCOUNTER (OUTPATIENT)
Dept: CARDIOLOGY | Facility: CLINIC | Age: 50
Discharge: HOME | End: 2020-09-30
Payer: COMMERCIAL

## 2020-09-30 VITALS — HEIGHT: 64 IN | BODY MASS INDEX: 21.85 KG/M2 | WEIGHT: 128 LBS

## 2020-09-30 DIAGNOSIS — R07.89 OTHER CHEST PAIN: ICD-10-CM

## 2020-09-30 DIAGNOSIS — I34.0 NONRHEUMATIC MITRAL VALVE REGURGITATION: ICD-10-CM

## 2020-09-30 DIAGNOSIS — E78.2 MIXED HYPERLIPIDEMIA: ICD-10-CM

## 2020-09-30 LAB
ASCENDING AORTA: 3.1 CM
AV PEAK GRADIENT: 9 MMHG
AV PEAK VELOCITY-S: 1.48 M/S
BSA FOR ECHO PROCEDURE: 1.62 M2
E WAVE DECELERATION TIME: 173 MS
E/A RATIO: 1.3
E/E' RATIO: 14
E/LAT E' RATIO: 8.6
EDV (BP): 63.6 CM3
EF (A4C): 72.3 %
EF A2C: 72.9 %
EJECTION FRACTION: 72.5 %
ESV (BP): 17.5 CM3
LA ESV (BP): 28.9 CM3
LA ESV INDEX (A2C): 18.21 CM3/M2
LA ESV INDEX (BP): 17.84 CM3/M2
LAAS-AP2: 12.8 CM2
LAAS-AP4: 11.8 CM2
LALD A4C: 4.14 CM
LALD A4C: 4.45 CM
LAV-S: 29.5 CM3
LEFT ATRIUM VOLUME INDEX: 16.36 CM3/M2
LEFT ATRIUM VOLUME: 26.5 CM3
LEFT VENTRICLE DIASTOLIC VOLUME INDEX: 33.21 CM3/M2
LEFT VENTRICLE DIASTOLIC VOLUME: 53.8 CM3
LEFT VENTRICLE SYSTOLIC VOLUME INDEX: 9.2 CM3/M2
LEFT VENTRICLE SYSTOLIC VOLUME: 14.9 CM3
LV DIASTOLIC VOLUME: 66.8 CM3
LV ESV (APICAL 2 CHAMBER): 18.1 CM3
LVAD-AP2: 23.9 CM2
LVAD-AP4: 20 CM2
LVAS-AP2: 10.7 CM2
LVAS-AP4: 9.14 CM2
LVEDVI(A2C): 41.23 CM3/M2
LVEDVI(BP): 39.26 CM3/M2
LVESVI(A2C): 11.17 CM3/M2
LVESVI(BP): 10.8 CM3/M2
LVLD-AP2: 6.92 CM
LVLD-AP4: 6.12 CM
LVLS-AP2: 5.43 CM
LVLS-AP4: 4.72 CM
LVOT PEAK VELOCITY: 1.05 M/S
MV E'TISSUE VEL-LAT: 0.13 M/S
MV E'TISSUE VEL-MED: 0.08 M/S
MV PEAK A VEL: 0.88 M/S
MV PEAK E VEL: 1.14 M/S
RVOT VMAX: 0.66 M/S
STRESS ANGINA INDEX: 0
STRESS BASELINE BP: NORMAL MMHG
STRESS BASELINE HR: 80 BPM
STRESS O2 SAT REST: 95 %
STRESS PERCENT HR: 88 %
STRESS POST ESTIMATED WORKLOAD: 13.4 METS
STRESS POST EXERCISE DUR MIN: 10 MIN
STRESS POST EXERCISE DUR SEC: 20 SEC
STRESS POST PEAK BP: NORMAL MMHG
STRESS POST PEAK HR: 151 BPM
STRESS TARGET HR: 145 BPM
TR MAX PG: 24 MMHG
TRICUSPID VALVE PEAK REGURGITATION VELOCITY: 2.44 M/S
TV REST PULMONARY ARTERY PRESSURE: 29 MMHG

## 2020-09-30 PROCEDURE — 93351 STRESS TTE COMPLETE: CPT | Performed by: INTERNAL MEDICINE

## 2020-09-30 NOTE — PROGRESS NOTES
Patient presented today for stress echo.  Blood pressure elevated during last routine viist 148-154/70-80s however much improved today prior to testing at 123/80.  No interventions necessary, continue to monitor.

## 2020-09-30 NOTE — PROGRESS NOTES
Following the stress test I discussed the results and reviewed that as a low risk test.  Reviewed false negatives and differential obstructive versus nonobstructive disease.  Stressed prompt evaluation of any symptoms as well as guideline directed therapy to reduce cardiovascular risk.

## 2020-12-10 RX ORDER — OMEPRAZOLE 20 MG/1
CAPSULE, DELAYED RELEASE ORAL
Qty: 90 CAPSULE | Refills: 0 | Status: SHIPPED | OUTPATIENT
Start: 2020-12-10 | End: 2020-12-17

## 2020-12-17 ENCOUNTER — TELEMEDICINE (OUTPATIENT)
Dept: FAMILY MEDICINE | Facility: CLINIC | Age: 50
End: 2020-12-17
Payer: COMMERCIAL

## 2020-12-17 DIAGNOSIS — E78.2 MIXED HYPERLIPIDEMIA: Primary | ICD-10-CM

## 2020-12-17 PROBLEM — R07.89 OTHER CHEST PAIN: Status: RESOLVED | Noted: 2020-09-10 | Resolved: 2020-12-17

## 2020-12-17 PROCEDURE — 99212 OFFICE O/P EST SF 10 MIN: CPT | Mod: 95 | Performed by: FAMILY MEDICINE

## 2020-12-17 RX ORDER — ROSUVASTATIN CALCIUM 10 MG/1
10 TABLET, COATED ORAL
Qty: 30 TABLET | Refills: 0 | Status: SHIPPED | OUTPATIENT
Start: 2020-12-17 | End: 2020-12-24 | Stop reason: SDUPTHER

## 2020-12-17 ASSESSMENT — ENCOUNTER SYMPTOMS
FEVER: 0
ABDOMINAL PAIN: 0
SHORTNESS OF BREATH: 0
MYALGIAS: 0

## 2020-12-17 NOTE — ASSESSMENT & PLAN NOTE
Continue medical regimen and check CMP Lipids. He should schedule a physical in a few months when he feels better about the pandemic

## 2020-12-17 NOTE — PROGRESS NOTES
Verification of Patient Location:  The patient affirms they are currently located in the following state: [place]    Request for Consent:    Video Encounter   Yasemin, my name is Mercy Walton MD.  Before we proceed, can you please verify your identification by telling me your full name and date of birth?  Can you tell me who is in the room with you? He identified himself and no one was in the room with hjim    You and I are about to have a telemedicine check-in or visit because you have requested it.  This is a live video-conference.  I am a real person, speaking to you in real time.  There is no one else with me on the video-conference.  However, when we use ([employer], [employer], etc) it is important for you to know that the video-conference may not be secure or private.  I am not recording this conversation and I am asking you not to record it.  This telemedicine visit will be billed to your health insurance or you, if you are self-insured.  You understand you will be responsible for any copayments or coinsurances that apply to your telemedicine visit.  Communication platform used for this encounter:  [employer]     Before starting our telemedicine visit, I am required to get your consent for this virtual check-in or visit by telemedicine. Do you consent?      Patient Response to Request for Consent:  Yes      Visit Documentation:  Subjective     Patient ID: Sushant Hazel is a 50 y.o. male.  1970        Hyperlipidemia: tries to eat a low fat low carboh\ydrfate diet.,  He is exercising 3 tmes a week (resistance bands and weights). Does cardio as well (jumps rope, runs in place, etc)_  He's been woriking from home due to COVID pandemic      The following have been reviewed and updated as appropriate in this visit:  Tobacco  Allergies  Meds  Problems  Med Hx  Surg Hx  Fam Hx  Soc Hx        Review of Systems   Constitutional: Negative for fever.   Respiratory: Negative for shortness of breath.     Gastrointestinal: Negative for abdominal pain.   Musculoskeletal: Negative for myalgias.   Skin: Negative for rash.         Assessment/Plan   Diagnoses and all orders for this visit:    Mixed hyperlipidemia (Primary)  Assessment & Plan:  Continue medical regimen and check CMP Lipids. He should schedule a physical in a few months when he feels better about the pandemic    Orders:  -     Comprehensive metabolic panel; Future  -     Lipid panel; Future  -     rosuvastatin (CRESTOR) 10 mg tablet; Take 1 tablet (10 mg total) by mouth once daily.      Time Spent in Medical Discussion During This Encounter:     8 minutes

## 2020-12-24 ENCOUNTER — TELEPHONE (OUTPATIENT)
Dept: FAMILY MEDICINE | Facility: CLINIC | Age: 50
End: 2020-12-24

## 2020-12-24 DIAGNOSIS — E78.2 MIXED HYPERLIPIDEMIA: ICD-10-CM

## 2020-12-24 RX ORDER — ROSUVASTATIN CALCIUM 10 MG/1
10 TABLET, COATED ORAL
Qty: 30 TABLET | Refills: 0 | Status: SHIPPED | OUTPATIENT
Start: 2020-12-24 | End: 2021-03-29

## 2020-12-24 NOTE — TELEPHONE ENCOUNTER
On call note: I received a message from the answering service and spoke with Sushant. He went to  his rosuvastatin prescription and [facility] told him it wasn't there. [facility] notes that receipt was confirmed by pharmacy on 12/17/2020. Prescription sent again.

## 2021-03-17 RX ORDER — OMEPRAZOLE 20 MG/1
CAPSULE, DELAYED RELEASE ORAL
Qty: 90 CAPSULE | Refills: 0 | Status: SHIPPED | OUTPATIENT
Start: 2021-03-17 | End: 2021-05-11

## 2021-04-27 DIAGNOSIS — E78.2 MIXED HYPERLIPIDEMIA: ICD-10-CM

## 2021-04-28 RX ORDER — ROSUVASTATIN CALCIUM 10 MG/1
10 TABLET, COATED ORAL
Qty: 30 TABLET | Refills: 0 | Status: SHIPPED | OUTPATIENT
Start: 2021-04-28 | End: 2021-05-11 | Stop reason: SDUPTHER

## 2021-04-30 ENCOUNTER — TELEPHONE (OUTPATIENT)
Dept: FAMILY MEDICINE | Facility: CLINIC | Age: 51
End: 2021-04-30

## 2021-04-30 NOTE — TELEPHONE ENCOUNTER
They were ordered in December. OK to reprint if he can't find slip. It won't reach him in time if it needs to be mailed

## 2021-04-30 NOTE — TELEPHONE ENCOUNTER
Pt schedule med check appointment on 5/11/21, pt would like to have blood work prior emily.pt use our lab in [place].

## 2021-05-04 ENCOUNTER — APPOINTMENT (OUTPATIENT)
Dept: LAB | Age: 51
End: 2021-05-04
Attending: FAMILY MEDICINE
Payer: COMMERCIAL

## 2021-05-04 DIAGNOSIS — E78.2 MIXED HYPERLIPIDEMIA: ICD-10-CM

## 2021-05-04 LAB
ALBUMIN SERPL-MCNC: 4 G/DL (ref 3.4–5)
ALP SERPL-CCNC: 54 IU/L (ref 35–126)
ALT SERPL-CCNC: 31 IU/L (ref 16–63)
ANION GAP SERPL CALC-SCNC: 5 MEQ/L (ref 3–15)
AST SERPL-CCNC: 27 IU/L (ref 15–41)
BILIRUB SERPL-MCNC: 1.6 MG/DL (ref 0.3–1.2)
BUN SERPL-MCNC: 16 MG/DL (ref 8–20)
CALCIUM SERPL-MCNC: 9 MG/DL (ref 8.9–10.3)
CHLORIDE SERPL-SCNC: 103 MEQ/L (ref 98–109)
CHOLEST SERPL-MCNC: 139 MG/DL
CO2 SERPL-SCNC: 31 MEQ/L (ref 22–32)
CREAT SERPL-MCNC: 1.1 MG/DL (ref 0.8–1.3)
GFR SERPL CREATININE-BSD FRML MDRD: >60 ML/MIN/1.73M*2
GLUCOSE SERPL-MCNC: 94 MG/DL (ref 70–99)
HDLC SERPL-MCNC: 45 MG/DL
HDLC SERPL: 3.1 {RATIO}
LDLC SERPL CALC-MCNC: 76 MG/DL
NONHDLC SERPL-MCNC: 94 MG/DL
POTASSIUM SERPL-SCNC: 4 MEQ/L (ref 3.6–5.1)
PROT SERPL-MCNC: 6.6 G/DL (ref 6–8.2)
SODIUM SERPL-SCNC: 139 MEQ/L (ref 136–144)
TRIGL SERPL-MCNC: 92 MG/DL (ref 30–149)

## 2021-05-04 PROCEDURE — 80061 LIPID PANEL: CPT

## 2021-05-04 PROCEDURE — 36415 COLL VENOUS BLD VENIPUNCTURE: CPT

## 2021-05-04 PROCEDURE — 80053 COMPREHEN METABOLIC PANEL: CPT

## 2021-05-11 ENCOUNTER — OFFICE VISIT (OUTPATIENT)
Dept: FAMILY MEDICINE | Facility: CLINIC | Age: 51
End: 2021-05-11
Payer: COMMERCIAL

## 2021-05-11 VITALS
DIASTOLIC BLOOD PRESSURE: 66 MMHG | OXYGEN SATURATION: 98 % | BODY MASS INDEX: 23.92 KG/M2 | WEIGHT: 139.38 LBS | SYSTOLIC BLOOD PRESSURE: 128 MMHG | RESPIRATION RATE: 16 BRPM | HEART RATE: 84 BPM

## 2021-05-11 DIAGNOSIS — E78.2 MIXED HYPERLIPIDEMIA: Primary | ICD-10-CM

## 2021-05-11 DIAGNOSIS — R17 ELEVATED BILIRUBIN: ICD-10-CM

## 2021-05-11 PROBLEM — R03.0 ELEVATED BLOOD PRESSURE READING: Status: RESOLVED | Noted: 2020-09-10 | Resolved: 2021-05-11

## 2021-05-11 PROCEDURE — 3008F BODY MASS INDEX DOCD: CPT | Performed by: FAMILY MEDICINE

## 2021-05-11 PROCEDURE — 99213 OFFICE O/P EST LOW 20 MIN: CPT | Performed by: FAMILY MEDICINE

## 2021-05-11 RX ORDER — ROSUVASTATIN CALCIUM 10 MG/1
10 TABLET, COATED ORAL DAILY
Qty: 90 TABLET | Refills: 1 | Status: SHIPPED | OUTPATIENT
Start: 2021-05-11 | End: 2021-11-02

## 2021-05-11 ASSESSMENT — ENCOUNTER SYMPTOMS
MYALGIAS: 0
ABDOMINAL PAIN: 0
DYSURIA: 0
SORE THROAT: 0
HEADACHES: 0
SHORTNESS OF BREATH: 0
FEVER: 0
FATIGUE: 0
HEMATURIA: 0

## 2021-09-20 ENCOUNTER — APPOINTMENT (OUTPATIENT)
Dept: LAB | Age: 51
End: 2021-09-20
Attending: FAMILY MEDICINE
Payer: COMMERCIAL

## 2021-09-20 DIAGNOSIS — R17 ELEVATED BILIRUBIN: ICD-10-CM

## 2021-09-20 DIAGNOSIS — E78.2 MIXED HYPERLIPIDEMIA: ICD-10-CM

## 2021-09-20 LAB
ALBUMIN SERPL-MCNC: 4.2 G/DL (ref 3.4–5)
ALBUMIN SERPL-MCNC: 4.2 G/DL (ref 3.4–5)
ALP SERPL-CCNC: 60 IU/L (ref 35–126)
ALP SERPL-CCNC: 60 IU/L (ref 35–126)
ALT SERPL-CCNC: 35 IU/L (ref 16–63)
ALT SERPL-CCNC: 35 IU/L (ref 16–63)
ANION GAP SERPL CALC-SCNC: 9 MEQ/L (ref 3–15)
AST SERPL-CCNC: 24 IU/L (ref 15–41)
AST SERPL-CCNC: 24 IU/L (ref 15–41)
BILIRUB DIRECT SERPL-MCNC: 0.2 MG/DL
BILIRUB SERPL-MCNC: 1 MG/DL (ref 0.3–1.2)
BILIRUB SERPL-MCNC: 1 MG/DL (ref 0.3–1.2)
BUN SERPL-MCNC: 16 MG/DL (ref 8–20)
CALCIUM SERPL-MCNC: 9.3 MG/DL (ref 8.9–10.3)
CHLORIDE SERPL-SCNC: 103 MEQ/L (ref 98–109)
CHOLEST SERPL-MCNC: 142 MG/DL
CO2 SERPL-SCNC: 27 MEQ/L (ref 22–32)
CREAT SERPL-MCNC: 1.1 MG/DL (ref 0.8–1.3)
GFR SERPL CREATININE-BSD FRML MDRD: >60 ML/MIN/1.73M*2
GLUCOSE SERPL-MCNC: 94 MG/DL (ref 70–99)
HDLC SERPL-MCNC: 43 MG/DL
HDLC SERPL: 3.3 {RATIO}
LDLC SERPL CALC-MCNC: 79 MG/DL
NONHDLC SERPL-MCNC: 99 MG/DL
POTASSIUM SERPL-SCNC: 4.1 MEQ/L (ref 3.6–5.1)
PROT SERPL-MCNC: 6.4 G/DL (ref 6–8.2)
PROT SERPL-MCNC: 6.4 G/DL (ref 6–8.2)
SODIUM SERPL-SCNC: 139 MEQ/L (ref 136–144)
TRIGL SERPL-MCNC: 101 MG/DL (ref 30–149)

## 2021-09-20 PROCEDURE — 36415 COLL VENOUS BLD VENIPUNCTURE: CPT

## 2021-09-20 PROCEDURE — 80053 COMPREHEN METABOLIC PANEL: CPT

## 2021-09-20 PROCEDURE — 82248 BILIRUBIN DIRECT: CPT

## 2021-09-20 PROCEDURE — 80061 LIPID PANEL: CPT

## 2021-11-11 ENCOUNTER — OFFICE VISIT (OUTPATIENT)
Dept: FAMILY MEDICINE | Facility: CLINIC | Age: 51
End: 2021-11-11
Payer: COMMERCIAL

## 2021-11-11 VITALS
SYSTOLIC BLOOD PRESSURE: 124 MMHG | OXYGEN SATURATION: 97 % | WEIGHT: 137 LBS | HEART RATE: 60 BPM | BODY MASS INDEX: 23.52 KG/M2 | DIASTOLIC BLOOD PRESSURE: 72 MMHG

## 2021-11-11 DIAGNOSIS — E78.2 MIXED HYPERLIPIDEMIA: Primary | ICD-10-CM

## 2021-11-11 PROCEDURE — 99213 OFFICE O/P EST LOW 20 MIN: CPT | Performed by: FAMILY MEDICINE

## 2021-11-11 PROCEDURE — 3008F BODY MASS INDEX DOCD: CPT | Performed by: FAMILY MEDICINE

## 2021-11-11 ASSESSMENT — ENCOUNTER SYMPTOMS
SORE THROAT: 0
ABDOMINAL PAIN: 0
FATIGUE: 0
FEVER: 0
MYALGIAS: 0
HEADACHES: 0
HEMATURIA: 0
DYSURIA: 0
SHORTNESS OF BREATH: 0

## 2021-11-11 NOTE — PROGRESS NOTES
[facility] at [facility]     Reason for visit:   Chief Complaint   Patient presents with   • Follow-up      HPI    Hyperlipidemia: taking medication regularly. Tries to eat a low fat low carbohydrate diet.,  He is exercising 3 tmes a week (light  weights). Does cardio as well (plays [employer] 3-4 times a week)    Still working from home due to COVID 19 pandemic    He'll check on Shingrix and Tdap coverage    Colonoscopy: 2016    Sushant Hazel is a 51 y.o. male      The following have been reviewed and updated as appropriate in this visit  Patient Active Problem List    Diagnosis Date Noted   • Mixed hyperlipidemia 08/29/2018   • Mitral valve insufficiency 08/29/2018   • Glaucoma suspect 08/28/2018   • Rhinitis sicca 01/02/2018   • Cervical arthritis 04/28/2015     Past Medical History:   Diagnosis Date   • Hyperlipemia    • Kidney stones 06/2011   • Lyme disease 04/2004   • Mitral valve prolapse    • Other chest pain 9/10/2020     History reviewed. No pertinent surgical history.  Social History     Socioeconomic History   • Marital status:      Spouse name: Not on file   • Number of children: Not on file   • Years of education: Not on file   • Highest education level: Not on file   Occupational History   • Not on file   Tobacco Use   • Smoking status: Never Smoker   • Smokeless tobacco: Never Used   Substance and Sexual Activity   • Alcohol use: Yes     Alcohol/week: 2.0 - 4.0 standard drinks     Types: 2 - 4 Standard drinks or equivalent per week     Comment: Socially    • Drug use: No   • Sexual activity: Yes     Partners: Female   Other Topics Concern   • Not on file   Social History Narrative   • Not on file     Social Determinants of Health     Financial Resource Strain:    • Difficulty of Paying Living Expenses: Not on file   Food Insecurity:    • Worried About Running Out of Food in the Last Year: Not on file   • Ran Out of Food in the Last Year: Not on file   Transportation Needs:    •  Lack of Transportation (Medical): Not on file   • Lack of Transportation (Non-Medical): Not on file   Physical Activity:    • Days of Exercise per Week: Not on file   • Minutes of Exercise per Session: Not on file   Stress:    • Feeling of Stress : Not on file   Social Connections:    • Frequency of Communication with Friends and Family: Not on file   • Frequency of Social Gatherings with Friends and Family: Not on file   • Attends Hinduism Services: Not on file   • Active Member of Clubs or Organizations: Not on file   • Attends Club or Organization Meetings: Not on file   • Marital Status: Not on file   Intimate Partner Violence:    • Fear of Current or Ex-Partner: Not on file   • Emotionally Abused: Not on file   • Physically Abused: Not on file   • Sexually Abused: Not on file   Housing Stability:    • Unable to Pay for Housing in the Last Year: Not on file   • Number of Places Lived in the Last Year: Not on file   • Unstable Housing in the Last Year: Not on file       Family History   Problem Relation Age of Onset   • Hyperlipidemia Biological Mother    • Hyperlipidemia Biological Father    • No Known Problems Biological Sister    • No Known Problems Biological Brother    • Prostate cancer Other    • Diabetes Other    • Hypertension Other        No Known Allergies    Current Outpatient Medications   Medication Sig Dispense Refill   • cholecalciferol, vitamin D3, (VITAMIN D3) 1,000 unit capsule 2 capsules po once a day     • coenzyme Q10 (COQ10) 100 mg capsule Take 100 mg by mouth daily.     • rosuvastatin 10 mg tablet TAKE 1 TABLET BY MOUTH EVERY DAY 30 tablet 1     No current facility-administered medications for this visit.     Review of Systems   Constitutional: Negative for fatigue and fever.   HENT: Negative for ear pain and sore throat.    Respiratory: Negative for shortness of breath.    Cardiovascular: Negative for chest pain.   Gastrointestinal: Negative for abdominal pain.   Genitourinary: Negative  for dysuria and hematuria.   Musculoskeletal: Negative for myalgias.   Skin: Negative for rash.   Neurological: Negative for headaches.     Objective   Vitals:    11/11/21 1521   BP: 124/72   BP Location: Left upper arm   Patient Position: Sitting   Pulse: 60   SpO2: 97%   Weight: 62.1 kg (137 lb)     Body mass index is 23.52 kg/m².    Physical Exam  Constitutional:       General: He is not in acute distress.     Appearance: He is well-developed.   HENT:      Head: Normocephalic and atraumatic.      Right Ear: Tympanic membrane and ear canal normal.      Left Ear: Tympanic membrane and ear canal normal.   Eyes:      General: Lids are normal.      Conjunctiva/sclera: Conjunctivae normal.      Pupils: Pupils are equal, round, and reactive to light.   Neck:      Thyroid: No thyromegaly.   Cardiovascular:      Rate and Rhythm: Normal rate and regular rhythm.      Heart sounds: Normal heart sounds. No murmur heard.  No friction rub. No gallop.    Pulmonary:      Effort: Pulmonary effort is normal.      Breath sounds: Normal breath sounds.   Abdominal:      Palpations: Abdomen is soft.      Tenderness: There is no abdominal tenderness.   Musculoskeletal:      Right lower leg: No edema.      Left lower leg: No edema.   Lymphadenopathy:      Cervical: No cervical adenopathy.   Skin:     Coloration: Skin is not cyanotic.      Nails: There is no clubbing.   Neurological:      Mental Status: He is alert.       Procedures       POINT OF CARE TESTING:    No results found for this visit on 11/11/21.     Assessment   Diagnoses and all orders for this visit:    Mixed hyperlipidemia (Primary)  Assessment & Plan:  Lipids acceptable. Continue medical regimen and check CMP Lipids  months    Orders:  -     Comprehensive metabolic panel; Future  -     Lipid panel; Future       Educated patient on any new medications/doses that I prescribed today and patient expressed understanding and patient expressed understanding of and agreement with  plan  Return in about 6 months (around 5/11/2022) for Next scheduled follow-up.     Mercy Walton MD  11/11/2021

## 2021-12-04 DIAGNOSIS — E78.2 MIXED HYPERLIPIDEMIA: ICD-10-CM

## 2021-12-04 NOTE — TELEPHONE ENCOUNTER
Medicine Refill Request    Last Office Visit: 11/11/2021  Last Telemedicine Visit: 12/17/2020 Mercy Walton MD    Next Office Visit: 5/12/2022  Next Telemedicine Visit: Visit date not found         Current Outpatient Medications:   •  cholecalciferol, vitamin D3, (VITAMIN D3) 1,000 unit capsule, 2 capsules po once a day, Disp: , Rfl:   •  coenzyme Q10 (COQ10) 100 mg capsule, Take 100 mg by mouth daily., Disp: , Rfl:   •  rosuvastatin 10 mg tablet, TAKE 1 TABLET BY MOUTH EVERY DAY, Disp: 30 tablet, Rfl: 1      BP Readings from Last 3 Encounters:   11/11/21 124/72   05/11/21 128/66   09/10/20 (!) 148/78       Recent Lab results:  Lab Results   Component Value Date    CHOL 142 09/20/2021   ,   Lab Results   Component Value Date    HDL 43 (L) 09/20/2021   ,   Lab Results   Component Value Date    LDLCALC 79 09/20/2021   ,   Lab Results   Component Value Date    TRIG 101 09/20/2021        Lab Results   Component Value Date    GLUCOSE 94 09/20/2021   , No results found for: HGBA1C      Lab Results   Component Value Date    CREATININE 1.1 09/20/2021       Lab Results   Component Value Date    TSH 0.90 11/27/2019

## 2021-12-06 RX ORDER — ROSUVASTATIN CALCIUM 10 MG/1
TABLET, COATED ORAL
Qty: 30 TABLET | Refills: 6 | Status: SHIPPED | OUTPATIENT
Start: 2021-12-06 | End: 2022-06-03 | Stop reason: SDUPTHER

## 2022-05-27 ENCOUNTER — APPOINTMENT (OUTPATIENT)
Dept: LAB | Age: 52
End: 2022-05-27
Attending: FAMILY MEDICINE
Payer: COMMERCIAL

## 2022-05-27 DIAGNOSIS — E78.2 MIXED HYPERLIPIDEMIA: ICD-10-CM

## 2022-05-27 LAB
ALBUMIN SERPL-MCNC: 4.6 G/DL (ref 3.4–5)
ALP SERPL-CCNC: 57 IU/L (ref 35–126)
ALT SERPL-CCNC: 36 IU/L (ref 16–63)
ANION GAP SERPL CALC-SCNC: 10 MEQ/L (ref 3–15)
AST SERPL-CCNC: 27 IU/L (ref 15–41)
BILIRUB SERPL-MCNC: 1.2 MG/DL (ref 0.3–1.2)
BUN SERPL-MCNC: 19 MG/DL (ref 8–20)
CALCIUM SERPL-MCNC: 9.5 MG/DL (ref 8.9–10.3)
CHLORIDE SERPL-SCNC: 103 MEQ/L (ref 98–109)
CHOLEST SERPL-MCNC: 133 MG/DL
CO2 SERPL-SCNC: 28 MEQ/L (ref 22–32)
CREAT SERPL-MCNC: 1.1 MG/DL (ref 0.8–1.3)
GFR SERPL CREATININE-BSD FRML MDRD: >60 ML/MIN/1.73M*2
GLUCOSE SERPL-MCNC: 88 MG/DL (ref 70–99)
HDLC SERPL-MCNC: 43 MG/DL
HDLC SERPL: 3.1 {RATIO}
LDLC SERPL CALC-MCNC: 75 MG/DL
NONHDLC SERPL-MCNC: 90 MG/DL
POTASSIUM SERPL-SCNC: 4.3 MEQ/L (ref 3.6–5.1)
PROT SERPL-MCNC: 6.7 G/DL (ref 6–8.2)
SODIUM SERPL-SCNC: 141 MEQ/L (ref 136–144)
TRIGL SERPL-MCNC: 75 MG/DL (ref 30–149)

## 2022-05-27 PROCEDURE — 80061 LIPID PANEL: CPT

## 2022-05-27 PROCEDURE — 36415 COLL VENOUS BLD VENIPUNCTURE: CPT

## 2022-05-27 PROCEDURE — 80053 COMPREHEN METABOLIC PANEL: CPT

## 2022-06-03 ENCOUNTER — OFFICE VISIT (OUTPATIENT)
Dept: FAMILY MEDICINE | Facility: CLINIC | Age: 52
End: 2022-06-03
Payer: COMMERCIAL

## 2022-06-03 VITALS
SYSTOLIC BLOOD PRESSURE: 118 MMHG | DIASTOLIC BLOOD PRESSURE: 74 MMHG | OXYGEN SATURATION: 98 % | TEMPERATURE: 98.2 F | HEART RATE: 59 BPM | BODY MASS INDEX: 23.62 KG/M2 | WEIGHT: 137.6 LBS

## 2022-06-03 DIAGNOSIS — E78.2 MIXED HYPERLIPIDEMIA: Primary | ICD-10-CM

## 2022-06-03 PROCEDURE — 99213 OFFICE O/P EST LOW 20 MIN: CPT | Performed by: FAMILY MEDICINE

## 2022-06-03 PROCEDURE — 3008F BODY MASS INDEX DOCD: CPT | Performed by: FAMILY MEDICINE

## 2022-06-03 RX ORDER — ROSUVASTATIN CALCIUM 10 MG/1
10 TABLET, COATED ORAL
Qty: 90 TABLET | Refills: 1 | Status: SHIPPED | OUTPATIENT
Start: 2022-06-03 | End: 2022-11-28

## 2022-06-03 ASSESSMENT — ENCOUNTER SYMPTOMS
ABDOMINAL PAIN: 0
DYSURIA: 0
SHORTNESS OF BREATH: 0
SORE THROAT: 0
HEMATURIA: 0
HEADACHES: 0
MYALGIAS: 0
FEVER: 0

## 2022-06-03 NOTE — PROGRESS NOTES
[facility] at [facility]     Reason for visit:   Chief Complaint   Patient presents with   • Follow-up     6 months      HPI    Hyperlipidemia: taking medication regularly. Tries to eat a low fat low carbohydrate diet.,  He is exercising 3 tmes a week (light  weights). Does cardio as well (plays badminton 2-3times a week)    Still working from home due to COVID 19 pandemic    He'll check on Shingrix and Tdap coverage (he forgot but will check now)        Sushant Hazel is a 52 y.o. male      The following have been reviewed and updated as appropriate in this visit  Patient Active Problem List    Diagnosis Date Noted   • Mixed hyperlipidemia 08/29/2018   • Mitral valve insufficiency 08/29/2018   • Glaucoma suspect 08/28/2018   • Rhinitis sicca 01/02/2018   • Cervical arthritis 04/28/2015     Past Medical History:   Diagnosis Date   • Hyperlipemia    • Kidney stones 06/2011   • Lyme disease 04/2004   • Mitral valve prolapse    • Other chest pain 9/10/2020     History reviewed. No pertinent surgical history.  Social History     Socioeconomic History   • Marital status:      Spouse name: Not on file   • Number of children: Not on file   • Years of education: Not on file   • Highest education level: Not on file   Occupational History   • Not on file   Tobacco Use   • Smoking status: Never Smoker   • Smokeless tobacco: Never Used   Substance and Sexual Activity   • Alcohol use: Not Currently     Alcohol/week: 2.0 - 4.0 standard drinks     Types: 2 - 4 Standard drinks or equivalent per week     Comment: Socially    • Drug use: No   • Sexual activity: Yes     Partners: Female   Other Topics Concern   • Not on file   Social History Narrative   • Not on file     Social Determinants of Health     Financial Resource Strain: Not on file   Food Insecurity: Not on file   Transportation Needs: Not on file   Physical Activity: Not on file   Stress: Not on file   Social Connections: Not on file   Intimate Partner  Violence: Not on file   Housing Stability: Not on file       Family History   Problem Relation Age of Onset   • Hyperlipidemia Biological Mother    • Hyperlipidemia Biological Father    • No Known Problems Biological Sister    • No Known Problems Biological Brother    • Prostate cancer Other    • Diabetes Other    • Hypertension Other        No Known Allergies    Current Outpatient Medications   Medication Sig Dispense Refill   • cholecalciferol, vitamin D3, 25 mcg (1,000 unit) capsule 5,000 Units.     • coenzyme Q10 (COQ10) 100 mg capsule Take 100 mg by mouth daily.     • rosuvastatin (CRESTOR) 10 mg tablet Take 1 tablet (10 mg total) by mouth once daily. 90 tablet 1     No current facility-administered medications for this visit.     Review of Systems   Constitutional: Negative for fever.   HENT: Negative for ear pain and sore throat.    Respiratory: Negative for shortness of breath.    Cardiovascular: Negative for chest pain.   Gastrointestinal: Negative for abdominal pain.   Genitourinary: Negative for dysuria and hematuria.   Musculoskeletal: Negative for myalgias.   Skin: Negative for rash.   Neurological: Negative for headaches.     Objective   Vitals:    06/03/22 0954   BP: 118/74   BP Location: Right upper arm   Patient Position: Sitting   Pulse: (!) 59   Temp: 36.8 °C (98.2 °F)   TempSrc: Oral   SpO2: 98%   Weight: 62.4 kg (137 lb 9.6 oz)     Body mass index is 23.62 kg/m².    Physical Exam  Constitutional:       General: He is not in acute distress.     Appearance: He is well-developed.   HENT:      Head: Normocephalic and atraumatic.      Right Ear: Tympanic membrane and ear canal normal.      Left Ear: Tympanic membrane and ear canal normal.   Eyes:      General: Lids are normal.      Conjunctiva/sclera: Conjunctivae normal.      Pupils: Pupils are equal, round, and reactive to light.   Neck:      Thyroid: No thyromegaly.   Cardiovascular:      Rate and Rhythm: Normal rate and regular rhythm.      Heart  sounds: Normal heart sounds. No murmur heard.    No friction rub. No gallop.   Pulmonary:      Effort: Pulmonary effort is normal.      Breath sounds: Normal breath sounds.   Abdominal:      Palpations: Abdomen is soft.      Tenderness: There is no abdominal tenderness.   Musculoskeletal:      Right lower leg: No edema.      Left lower leg: No edema.   Lymphadenopathy:      Cervical: No cervical adenopathy.   Skin:     Coloration: Skin is not cyanotic.      Nails: There is no clubbing.   Neurological:      Mental Status: He is alert.       Procedures       POINT OF CARE TESTING:    No results found for this visit on 06/03/22.     Assessment   Diagnoses and all orders for this visit:    Mixed hyperlipidemia (Primary)  Assessment & Plan:  Lipids acceptable. Continue medical regimen and check CMP Lipids 6 months    Orders:  -     rosuvastatin (CRESTOR) 10 mg tablet; Take 1 tablet (10 mg total) by mouth once daily.  -     Comprehensive metabolic panel; Future  -     Lipid panel; Future       Educated patient on any new medications/doses that I prescribed today and patient expressed understanding and patient expressed understanding of and agreement with plan  Return in about 6 months (around 12/3/2022) for Next scheduled follow-up.     Mercy Walton MD  6/3/2022

## 2022-06-16 ENCOUNTER — DOCUMENTATION (OUTPATIENT)
Dept: FAMILY MEDICINE | Facility: CLINIC | Age: 52
End: 2022-06-16
Payer: COMMERCIAL

## 2022-06-16 NOTE — PROGRESS NOTES
Radha Ruano MA has completed a chart review for Sushant Hazel and have determined that the care gap has been satisfied.    Care Gap Source: IBC - QIPS    Care Gap(s) Identified: Colorectal Cancer Screening    Chart Review Completed: Yes    Colonoscopy completed 3/25/16 [facility].

## 2022-11-26 DIAGNOSIS — E78.2 MIXED HYPERLIPIDEMIA: ICD-10-CM

## 2022-11-27 NOTE — TELEPHONE ENCOUNTER
Medicine Refill Request    Last Office: 6/3/2022   Last Consult Visit: Visit date not found  Last Telemedicine Visit: 12/17/2020 Mercy Walton MD    Next Appointment: Visit date not found      Current Outpatient Medications:     cholecalciferol, vitamin D3, 25 mcg (1,000 unit) capsule, 5,000 Units., Disp: , Rfl:     coenzyme Q10 (COQ10) 100 mg capsule, Take 100 mg by mouth daily., Disp: , Rfl:     rosuvastatin (CRESTOR) 10 mg tablet, Take 1 tablet (10 mg total) by mouth once daily., Disp: 90 tablet, Rfl: 1      BP Readings from Last 3 Encounters:   06/03/22 118/74   11/11/21 124/72   05/11/21 128/66       Recent Lab results:  Lab Results   Component Value Date    CHOL 133 05/27/2022   ,   Lab Results   Component Value Date    HDL 43 (L) 05/27/2022   ,   Lab Results   Component Value Date    LDLCALC 75 05/27/2022   ,   Lab Results   Component Value Date    TRIG 75 05/27/2022        Lab Results   Component Value Date    GLUCOSE 88 05/27/2022   , No results found for: HGBA1C      Lab Results   Component Value Date    CREATININE 1.1 05/27/2022       Lab Results   Component Value Date    TSH 0.90 11/27/2019

## 2022-11-28 RX ORDER — ROSUVASTATIN CALCIUM 10 MG/1
TABLET, COATED ORAL
Qty: 90 TABLET | Refills: 1 | Status: SHIPPED | OUTPATIENT
Start: 2022-11-28 | End: 2023-05-26

## 2023-02-03 ENCOUNTER — APPOINTMENT (OUTPATIENT)
Dept: LAB | Age: 53
End: 2023-02-03
Attending: FAMILY MEDICINE
Payer: COMMERCIAL

## 2023-02-03 DIAGNOSIS — E78.2 MIXED HYPERLIPIDEMIA: ICD-10-CM

## 2023-02-03 LAB
ALBUMIN SERPL-MCNC: 4.2 G/DL (ref 3.4–5)
ALP SERPL-CCNC: 58 IU/L (ref 35–126)
ALT SERPL-CCNC: 40 IU/L (ref 16–63)
ANION GAP SERPL CALC-SCNC: 8 MEQ/L (ref 3–15)
AST SERPL-CCNC: 30 IU/L (ref 15–41)
BILIRUB SERPL-MCNC: 1.2 MG/DL (ref 0.3–1.2)
BUN SERPL-MCNC: 14 MG/DL (ref 8–20)
CALCIUM SERPL-MCNC: 9.4 MG/DL (ref 8.9–10.3)
CHLORIDE SERPL-SCNC: 105 MEQ/L (ref 98–109)
CHOLEST SERPL-MCNC: 130 MG/DL
CO2 SERPL-SCNC: 27 MEQ/L (ref 22–32)
CREAT SERPL-MCNC: 1.1 MG/DL (ref 0.8–1.3)
GFR SERPL CREATININE-BSD FRML MDRD: >60 ML/MIN/1.73M*2
GLUCOSE SERPL-MCNC: 89 MG/DL (ref 70–99)
HDLC SERPL-MCNC: 47 MG/DL
HDLC SERPL: 2.8 {RATIO}
LDLC SERPL CALC-MCNC: 69 MG/DL
NONHDLC SERPL-MCNC: 83 MG/DL
POTASSIUM SERPL-SCNC: 4.4 MEQ/L (ref 3.6–5.1)
PROT SERPL-MCNC: 6.4 G/DL (ref 6–8.2)
SODIUM SERPL-SCNC: 140 MEQ/L (ref 136–144)
TRIGL SERPL-MCNC: 68 MG/DL (ref 30–149)

## 2023-02-03 PROCEDURE — 36415 COLL VENOUS BLD VENIPUNCTURE: CPT

## 2023-02-03 PROCEDURE — 80053 COMPREHEN METABOLIC PANEL: CPT

## 2023-02-03 PROCEDURE — 80061 LIPID PANEL: CPT

## 2023-02-06 ENCOUNTER — TELEPHONE (OUTPATIENT)
Dept: FAMILY MEDICINE | Facility: CLINIC | Age: 53
End: 2023-02-06
Payer: COMMERCIAL

## 2023-02-06 DIAGNOSIS — E78.5 HYPERLIPIDEMIA, UNSPECIFIED HYPERLIPIDEMIA TYPE: Primary | ICD-10-CM

## 2023-02-06 NOTE — TELEPHONE ENCOUNTER
----- Message from Mercy Walton MD sent at 2/5/2023  4:12 PM EST -----  Labs look good. Continue same dose rosuvastatin and check CMP Lipids 4 months..(please pend labs)  He'll be due for an appt in June as well and should schedule one (chronic conditions or a physical, his preference). Please try to do labs a few days prior to his appt so that we can discuss results in person

## 2023-03-23 ENCOUNTER — OFFICE VISIT (OUTPATIENT)
Dept: FAMILY MEDICINE | Facility: CLINIC | Age: 53
End: 2023-03-23
Payer: COMMERCIAL

## 2023-03-23 VITALS
BODY MASS INDEX: 23.39 KG/M2 | WEIGHT: 137 LBS | SYSTOLIC BLOOD PRESSURE: 128 MMHG | HEIGHT: 64 IN | HEART RATE: 79 BPM | RESPIRATION RATE: 12 BRPM | DIASTOLIC BLOOD PRESSURE: 82 MMHG | OXYGEN SATURATION: 97 % | TEMPERATURE: 98 F

## 2023-03-23 DIAGNOSIS — F40.248 FEAR OF PUBLIC SPEAKING: Primary | ICD-10-CM

## 2023-03-23 PROCEDURE — 99213 OFFICE O/P EST LOW 20 MIN: CPT | Performed by: FAMILY MEDICINE

## 2023-03-23 PROCEDURE — 3008F BODY MASS INDEX DOCD: CPT | Performed by: FAMILY MEDICINE

## 2023-03-23 RX ORDER — PROPRANOLOL HYDROCHLORIDE 10 MG/1
TABLET ORAL
Qty: 30 TABLET | Refills: 0 | Status: SHIPPED | OUTPATIENT
Start: 2023-03-23 | End: 2023-12-21 | Stop reason: SDUPTHER

## 2023-03-23 ASSESSMENT — ENCOUNTER SYMPTOMS
ABDOMINAL PAIN: 0
SHORTNESS OF BREATH: 0
PALPITATIONS: 0
FEVER: 0
DYSPHORIC MOOD: 0

## 2023-03-23 ASSESSMENT — PATIENT HEALTH QUESTIONNAIRE - PHQ9: SUM OF ALL RESPONSES TO PHQ9 QUESTIONS 1 & 2: 0

## 2023-03-23 NOTE — ASSESSMENT & PLAN NOTE
Propranolol 10mg 1 hour prior to public speaking. I suggested that he try taking it at home once prior to using it for public speaking. He's to send me a portal message letting me know how it went

## 2023-03-23 NOTE — PROGRESS NOTES
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 When speaking to a large group feels shaky and feels very anxious.  For one presentation his hand was shaking so much that he coulsn't move the mouse. No other anxiety    Sushant Hazel is a 52 y.o. male      The following have been reviewed and updated as appropriate in this visit  Patient Active Problem List    Diagnosis Date Noted   • Fear of public speaking 03/23/2023   • Mixed hyperlipidemia 08/29/2018   • Mitral valve insufficiency 08/29/2018   • Glaucoma suspect 08/28/2018   • Rhinitis sicca 01/02/2018   • Cervical arthritis 04/28/2015     Past Medical History:   Diagnosis Date   • Hyperlipemia    • Kidney stones 06/2011   • Lyme disease 04/2004   • Mitral valve prolapse    • Other chest pain 9/10/2020     History reviewed. No pertinent surgical history.  Social History     Socioeconomic History   • Marital status:      Spouse name: Not on file   • Number of children: Not on file   • Years of education: Not on file   • Highest education level: Not on file   Occupational History   • Not on file   Tobacco Use   • Smoking status: Never   • Smokeless tobacco: Never   Vaping Use   • Vaping status: Not on file   Substance and Sexual Activity   • Alcohol use: Not Currently     Alcohol/week: 2.0 - 4.0 standard drinks of alcohol     Types: 2 - 4 Standard drinks or equivalent per week     Comment: Socially    • Drug use: No   • Sexual activity: Yes     Partners: Female   Other Topics Concern   • Not on file   Social History Narrative   • Not on file     Social Determinants of Health     Financial Resource Strain: Not on file   Food Insecurity: Not on file   Transportation Needs: Not on file   Physical Activity: Not on file   Stress: Not on file   Social Connections: Not on file  "  Intimate Partner Violence: Not on file   Housing Stability: Not on file       Family History   Problem Relation Age of Onset   • Hyperlipidemia Biological Mother    • Hyperlipidemia Biological Father    • No Known Problems Biological Sister    • No Known Problems Biological Brother    • Prostate cancer Other    • Diabetes Other    • Hypertension Other        No Known Allergies    Current Outpatient Medications   Medication Sig Dispense Refill   • cholecalciferol, vitamin D3, 25 mcg (1,000 unit) capsule 5,000 Units.     • coenzyme Q10 (COQ10) 100 mg capsule Take 100 mg by mouth daily.     • propranoloL (INDERAL) 10 mg tablet Take 1 tablet by oral route 1 hour prior to public speaking. 30 tablet 0   • rosuvastatin (CRESTOR) 10 mg tablet TAKE 1 TABLET BY MOUTH EVERY DAY 90 tablet 1     No current facility-administered medications for this visit.     Review of Systems   Constitutional: Negative for fever.   Respiratory: Negative for shortness of breath.    Cardiovascular: Negative for chest pain and palpitations.   Gastrointestinal: Negative for abdominal pain.   Genitourinary: Negative for decreased urine volume.   Psychiatric/Behavioral: Negative for dysphoric mood and suicidal ideas.     Objective   Vitals:    03/23/23 1334   BP: 128/82   Pulse: 79   Resp: 12   Temp: 36.7 °C (98 °F)   SpO2: 97%   Weight: 62.1 kg (137 lb)   Height: 1.626 m (5' 4\")     Body mass index is 23.52 kg/m².    Physical Exam  Constitutional:       General: He is not in acute distress.     Appearance: He is well-developed.   HENT:      Head: Normocephalic and atraumatic.      Right Ear: Tympanic membrane and ear canal normal.      Left Ear: Tympanic membrane and ear canal normal.   Eyes:      General: Lids are normal.      Conjunctiva/sclera: Conjunctivae normal.      Pupils: Pupils are equal, round, and reactive to light.   Neck:      Thyroid: No thyromegaly.   Cardiovascular:      Rate and Rhythm: Normal rate and regular rhythm.      Heart " sounds: Normal heart sounds. No murmur heard.     No friction rub. No gallop.   Pulmonary:      Effort: Pulmonary effort is normal.      Breath sounds: Normal breath sounds.   Abdominal:      Palpations: Abdomen is soft.      Tenderness: There is no abdominal tenderness.   Musculoskeletal:      Right lower leg: No edema.      Left lower leg: No edema.   Lymphadenopathy:      Cervical: No cervical adenopathy.   Skin:     Coloration: Skin is not cyanotic.      Nails: There is no clubbing.   Neurological:      Mental Status: He is alert.       Procedures       POINT OF CARE TESTING:    No results found for this visit on 03/23/23.     Assessment   Diagnoses and all orders for this visit:    Fear of public speaking (Primary)  Assessment & Plan:  Propranolol 10mg 1 hour prior to public speaking. I suggested that he try taking it at home once prior to using it for public speaking. He's to send me a portal message letting me know how it went      Other orders  -     propranoloL (INDERAL) 10 mg tablet; Take 1 tablet by oral route 1 hour prior to public speaking.       Educated patient on any new medications/doses that I prescribed today and patient expressed understanding and patient expressed understanding of and agreement with plan  No follow-ups on file.     Mercy Walton MD  3/23/2023

## 2023-04-20 RX ORDER — PROPRANOLOL HYDROCHLORIDE 10 MG/1
TABLET ORAL
Qty: 30 TABLET | Refills: 0 | OUTPATIENT
Start: 2023-04-20

## 2023-04-20 NOTE — TELEPHONE ENCOUNTER
Medicine Refill Request    Last Office: 3/23/2023   Last Consult Visit: Visit date not found  Last Telemedicine Visit: 12/17/2020 Mercy Walton MD    Next Appointment: Visit date not found      Current Outpatient Medications:   •  cholecalciferol, vitamin D3, 25 mcg (1,000 unit) capsule, 5,000 Units., Disp: , Rfl:   •  coenzyme Q10 (COQ10) 100 mg capsule, Take 100 mg by mouth daily., Disp: , Rfl:   •  propranoloL (INDERAL) 10 mg tablet, Take 1 tablet by oral route 1 hour prior to public speaking., Disp: 30 tablet, Rfl: 0  •  rosuvastatin (CRESTOR) 10 mg tablet, TAKE 1 TABLET BY MOUTH EVERY DAY, Disp: 90 tablet, Rfl: 1      BP Readings from Last 3 Encounters:   03/23/23 128/82   06/03/22 118/74   11/11/21 124/72       Recent Lab results:  Lab Results   Component Value Date    CHOL 130 02/03/2023   ,   Lab Results   Component Value Date    HDL 47 02/03/2023   ,   Lab Results   Component Value Date    LDLCALC 69 02/03/2023   ,   Lab Results   Component Value Date    TRIG 68 02/03/2023        Lab Results   Component Value Date    GLUCOSE 89 02/03/2023   , No results found for: HGBA1C      Lab Results   Component Value Date    CREATININE 1.1 02/03/2023       Lab Results   Component Value Date    TSH 0.90 11/27/2019

## 2023-05-26 DIAGNOSIS — E78.2 MIXED HYPERLIPIDEMIA: ICD-10-CM

## 2023-05-26 RX ORDER — ROSUVASTATIN CALCIUM 10 MG/1
TABLET, COATED ORAL
Qty: 90 TABLET | Refills: 1 | Status: SHIPPED | OUTPATIENT
Start: 2023-05-26 | End: 2023-11-21

## 2023-06-30 ENCOUNTER — APPOINTMENT (OUTPATIENT)
Dept: LAB | Age: 53
End: 2023-06-30
Attending: FAMILY MEDICINE
Payer: COMMERCIAL

## 2023-06-30 DIAGNOSIS — E78.5 HYPERLIPIDEMIA, UNSPECIFIED HYPERLIPIDEMIA TYPE: ICD-10-CM

## 2023-06-30 LAB
ALBUMIN SERPL-MCNC: 4.4 G/DL (ref 3.5–5.7)
ALP SERPL-CCNC: 57 IU/L (ref 34–104)
ALT SERPL-CCNC: 44 IU/L (ref 7–52)
ANION GAP SERPL CALC-SCNC: 6 MEQ/L (ref 3–15)
AST SERPL-CCNC: 26 IU/L (ref 13–39)
BILIRUB SERPL-MCNC: 0.9 MG/DL (ref 0.3–1)
BUN SERPL-MCNC: 16 MG/DL (ref 7–25)
CALCIUM SERPL-MCNC: 8.7 MG/DL (ref 8.6–10.3)
CHLORIDE SERPL-SCNC: 103 MEQ/L (ref 98–107)
CO2 SERPL-SCNC: 30 MEQ/L (ref 21–31)
CREAT SERPL-MCNC: 1 MG/DL (ref 0.7–1.3)
GFR SERPL CREATININE-BSD FRML MDRD: >60 ML/MIN/1.73M*2
GLUCOSE SERPL-MCNC: 80 MG/DL (ref 70–99)
POTASSIUM SERPL-SCNC: 4.1 MEQ/L (ref 3.5–5.1)
PROT SERPL-MCNC: 6.7 G/DL (ref 6.4–8.9)
SODIUM SERPL-SCNC: 139 MEQ/L (ref 136–145)

## 2023-06-30 PROCEDURE — 80053 COMPREHEN METABOLIC PANEL: CPT

## 2023-06-30 PROCEDURE — 36415 COLL VENOUS BLD VENIPUNCTURE: CPT

## 2023-06-30 PROCEDURE — 80061 LIPID PANEL: CPT

## 2023-07-03 ENCOUNTER — TELEPHONE (OUTPATIENT)
Dept: FAMILY MEDICINE | Facility: CLINIC | Age: 53
End: 2023-07-03
Payer: COMMERCIAL

## 2023-07-03 NOTE — TELEPHONE ENCOUNTER
----- Message from Mercy Walton MD sent at 7/1/2023 10:34 AM EDT -----  Labs look good. Continue same medication. He is due for an appt (chronic conditions) and should schedule one. I'll order next labs at his appt

## 2023-08-01 LAB
CHOLEST SERPL-MCNC: 138 MG/DL
HDLC SERPL-MCNC: 49 MG/DL
HDLC SERPL: 2.8 {RATIO}
LDLC SERPL CALC-MCNC: 69 MG/DL
NONHDLC SERPL-MCNC: 89 MG/DL
TRIGL SERPL-MCNC: 102 MG/DL

## 2023-08-07 ENCOUNTER — OFFICE VISIT (OUTPATIENT)
Dept: FAMILY MEDICINE | Facility: CLINIC | Age: 53
End: 2023-08-07
Payer: COMMERCIAL

## 2023-08-07 VITALS
OXYGEN SATURATION: 99 % | HEART RATE: 60 BPM | HEIGHT: 64 IN | BODY MASS INDEX: 23.63 KG/M2 | TEMPERATURE: 97.9 F | DIASTOLIC BLOOD PRESSURE: 80 MMHG | SYSTOLIC BLOOD PRESSURE: 118 MMHG | RESPIRATION RATE: 14 BRPM | WEIGHT: 138.4 LBS

## 2023-08-07 DIAGNOSIS — E78.2 MIXED HYPERLIPIDEMIA: Primary | ICD-10-CM

## 2023-08-07 DIAGNOSIS — Z12.5 PROSTATE CANCER SCREENING: ICD-10-CM

## 2023-08-07 DIAGNOSIS — F40.248 FEAR OF PUBLIC SPEAKING: ICD-10-CM

## 2023-08-07 PROCEDURE — 99214 OFFICE O/P EST MOD 30 MIN: CPT | Performed by: FAMILY MEDICINE

## 2023-08-07 PROCEDURE — 3008F BODY MASS INDEX DOCD: CPT | Performed by: FAMILY MEDICINE

## 2023-08-07 ASSESSMENT — ENCOUNTER SYMPTOMS
FATIGUE: 0
HEADACHES: 0
SHORTNESS OF BREATH: 0
HEMATURIA: 0
MYALGIAS: 0
DYSURIA: 0
ABDOMINAL PAIN: 0
SORE THROAT: 0

## 2023-08-07 NOTE — PROGRESS NOTES
[facility] at [facility]     Reason for visit:   Chief Complaint   Patient presents with   • Follow-up      HPI    Hyperlipidemia: taking medication regularly. Tries to eat a low fat low carbohydrate diet.,  He is exercising- 2-3 tmes a week at the gym). Does cardio as well (plays badminton 2-3times a week)  Fear of public speaking:  Prpanolol working well    He would like to have prostate cancer screening    He'll check on Shingrix and Tdap coverage (he forgot but will check now)        Sushant Hazel is a 53 y.o. male      The following have been reviewed and updated as appropriate in this visit  Patient Active Problem List    Diagnosis Date Noted   • Fear of public speaking 03/23/2023   • Mixed hyperlipidemia 08/29/2018   • Mitral valve insufficiency 08/29/2018   • Glaucoma suspect 08/28/2018   • Rhinitis sicca 01/02/2018   • Cervical arthritis 04/28/2015     Past Medical History:   Diagnosis Date   • Hyperlipemia    • Kidney stones 06/2011   • Lyme disease 04/2004   • Mitral valve prolapse    • Other chest pain 9/10/2020     History reviewed. No pertinent surgical history.  Social History     Socioeconomic History   • Marital status:      Spouse name: Not on file   • Number of children: Not on file   • Years of education: Not on file   • Highest education level: Not on file   Occupational History   • Not on file   Tobacco Use   • Smoking status: Never   • Smokeless tobacco: Never   Substance and Sexual Activity   • Alcohol use: Not Currently     Alcohol/week: 2.0 - 4.0 standard drinks of alcohol     Types: 2 - 4 Standard drinks or equivalent per week     Comment: Socially    • Drug use: No   • Sexual activity: Yes     Partners: Female   Other Topics Concern   • Not on file   Social History Narrative   • Not on file     Social Determinants of Health     Financial Resource Strain: Not on file   Food Insecurity: Not on file   Transportation Needs: Not on file   Physical Activity: Not on file  "  Stress: Not on file   Social Connections: Not on file   Intimate Partner Violence: Not on file   Housing Stability: Not on file       Family History   Problem Relation Age of Onset   • Hyperlipidemia Biological Mother    • Hyperlipidemia Biological Father    • No Known Problems Biological Sister    • No Known Problems Biological Brother    • Prostate cancer Other    • Diabetes Other    • Hypertension Other        No Known Allergies    Current Outpatient Medications   Medication Sig Dispense Refill   • cholecalciferol, vitamin D3, 25 mcg (1,000 unit) capsule 5,000 Units.     • coenzyme Q10 (COQ10) 100 mg capsule Take 100 mg by mouth daily.     • propranoloL (INDERAL) 10 mg tablet Take 1 tablet by oral route 1 hour prior to public speaking. 30 tablet 0   • rosuvastatin (CRESTOR) 10 mg tablet TAKE 1 TABLET BY MOUTH EVERY DAY 90 tablet 1     No current facility-administered medications for this visit.     Review of Systems   Constitutional: Negative for fatigue.   HENT: Negative for ear pain and sore throat.    Respiratory: Negative for shortness of breath.    Cardiovascular: Negative for chest pain.   Gastrointestinal: Negative for abdominal pain.   Genitourinary: Negative for dysuria and hematuria.   Musculoskeletal: Negative for myalgias.   Skin: Negative for rash.   Neurological: Negative for headaches.     Objective   Vitals:    08/07/23 0837   BP: 118/80   BP Location: Left upper arm   Patient Position: Sitting   Pulse: 60   Resp: 14   Temp: 36.6 °C (97.9 °F)   SpO2: 99%   Weight: 62.8 kg (138 lb 6.4 oz)   Height: 1.626 m (5' 4\")     Body mass index is 23.76 kg/m².    Physical Exam  Constitutional:       General: He is not in acute distress.     Appearance: He is well-developed.   HENT:      Head: Normocephalic and atraumatic.      Right Ear: Tympanic membrane and ear canal normal.      Left Ear: Tympanic membrane and ear canal normal.   Eyes:      General: Lids are normal.      Conjunctiva/sclera: Conjunctivae " normal.      Pupils: Pupils are equal, round, and reactive to light.   Neck:      Thyroid: No thyromegaly.   Cardiovascular:      Rate and Rhythm: Normal rate and regular rhythm.      Heart sounds: Normal heart sounds. No murmur heard.     No friction rub. No gallop.   Pulmonary:      Effort: Pulmonary effort is normal.      Breath sounds: Normal breath sounds.   Abdominal:      Palpations: Abdomen is soft.      Tenderness: There is no abdominal tenderness.   Musculoskeletal:      Right lower leg: No edema.      Left lower leg: No edema.   Lymphadenopathy:      Cervical: No cervical adenopathy.   Skin:     Coloration: Skin is not cyanotic.      Nails: There is no clubbing.   Neurological:      Mental Status: He is alert.       Procedures       POINT OF CARE TESTING:    No results found for this visit on 08/07/23.     Assessment   Diagnoses and all orders for this visit:    Mixed hyperlipidemia (Primary)  Assessment & Plan:  Lipids acceptable. Continue medical regimen and check CMP Lipids TSH  4 months    Orders:  -     Comprehensive metabolic panel; Future  -     Lipid panel; Future  -     TSH 3rd Generation; Future    Fear of public speaking  Assessment & Plan:  Doing well. Continue propanolol.      Prostate cancer screening  Comments:  check PSA  Orders:  -     PSA; Future       Educated patient on any new medications/doses that I prescribed today and patient expressed understanding and patient expressed understanding of and agreement with plan  Return in about 4 months (around 12/7/2023) for Next scheduled follow-up.     Mercy Walton MD  8/7/2023

## 2023-11-21 DIAGNOSIS — E78.2 MIXED HYPERLIPIDEMIA: ICD-10-CM

## 2023-11-21 RX ORDER — ROSUVASTATIN CALCIUM 10 MG/1
TABLET, COATED ORAL
Qty: 90 TABLET | Refills: 1 | Status: SHIPPED | OUTPATIENT
Start: 2023-11-21 | End: 2024-05-16

## 2023-12-15 ENCOUNTER — APPOINTMENT (OUTPATIENT)
Dept: LAB | Age: 53
End: 2023-12-15
Attending: FAMILY MEDICINE
Payer: COMMERCIAL

## 2023-12-15 DIAGNOSIS — Z12.5 PROSTATE CANCER SCREENING: ICD-10-CM

## 2023-12-15 DIAGNOSIS — E78.2 MIXED HYPERLIPIDEMIA: ICD-10-CM

## 2023-12-15 LAB
ALBUMIN SERPL-MCNC: 4.4 G/DL (ref 3.5–5.7)
ALP SERPL-CCNC: 51 IU/L (ref 34–125)
ALT SERPL-CCNC: 36 IU/L (ref 7–52)
ANION GAP SERPL CALC-SCNC: 6 MEQ/L (ref 3–15)
AST SERPL-CCNC: 27 IU/L (ref 13–39)
BILIRUB SERPL-MCNC: 1.2 MG/DL (ref 0.3–1.2)
BUN SERPL-MCNC: 17 MG/DL (ref 7–25)
CALCIUM SERPL-MCNC: 9.4 MG/DL (ref 8.6–10.3)
CHLORIDE SERPL-SCNC: 105 MEQ/L (ref 98–107)
CHOLEST SERPL-MCNC: 143 MG/DL
CO2 SERPL-SCNC: 30 MEQ/L (ref 21–31)
CREAT SERPL-MCNC: 1 MG/DL (ref 0.7–1.3)
EGFRCR SERPLBLD CKD-EPI 2021: >60 ML/MIN/1.73M*2
GLUCOSE SERPL-MCNC: 92 MG/DL (ref 70–99)
HDLC SERPL-MCNC: 50 MG/DL
HDLC SERPL: 2.9 {RATIO}
LDLC SERPL CALC-MCNC: 77 MG/DL
NONHDLC SERPL-MCNC: 93 MG/DL
POTASSIUM SERPL-SCNC: 4.1 MEQ/L (ref 3.5–5.1)
PROT SERPL-MCNC: 6.8 G/DL (ref 6–8.2)
PSA SERPL-MCNC: 0.64 NG/ML
SODIUM SERPL-SCNC: 141 MEQ/L (ref 136–145)
TRIGL SERPL-MCNC: 80 MG/DL
TSH SERPL DL<=0.05 MIU/L-ACNC: 1.2 MIU/L (ref 0.34–5.6)

## 2023-12-15 PROCEDURE — 84153 ASSAY OF PSA TOTAL: CPT

## 2023-12-15 PROCEDURE — 84443 ASSAY THYROID STIM HORMONE: CPT

## 2023-12-15 PROCEDURE — 80061 LIPID PANEL: CPT

## 2023-12-15 PROCEDURE — 36415 COLL VENOUS BLD VENIPUNCTURE: CPT

## 2023-12-15 PROCEDURE — 80053 COMPREHEN METABOLIC PANEL: CPT

## 2023-12-21 ENCOUNTER — OFFICE VISIT (OUTPATIENT)
Dept: FAMILY MEDICINE | Facility: CLINIC | Age: 53
End: 2023-12-21
Payer: COMMERCIAL

## 2023-12-21 VITALS
SYSTOLIC BLOOD PRESSURE: 119 MMHG | DIASTOLIC BLOOD PRESSURE: 74 MMHG | BODY MASS INDEX: 23.29 KG/M2 | RESPIRATION RATE: 16 BRPM | TEMPERATURE: 97.8 F | HEIGHT: 64 IN | WEIGHT: 136.4 LBS | HEART RATE: 68 BPM | OXYGEN SATURATION: 99 %

## 2023-12-21 DIAGNOSIS — F40.248 FEAR OF PUBLIC SPEAKING: ICD-10-CM

## 2023-12-21 DIAGNOSIS — E78.2 MIXED HYPERLIPIDEMIA: Primary | ICD-10-CM

## 2023-12-21 PROCEDURE — 99214 OFFICE O/P EST MOD 30 MIN: CPT | Performed by: FAMILY MEDICINE

## 2023-12-21 PROCEDURE — 3008F BODY MASS INDEX DOCD: CPT | Performed by: FAMILY MEDICINE

## 2023-12-21 RX ORDER — PROPRANOLOL HYDROCHLORIDE 10 MG/1
TABLET ORAL
Qty: 30 TABLET | Refills: 0 | Status: SHIPPED | OUTPATIENT
Start: 2023-12-21 | End: 2024-01-15 | Stop reason: SDUPTHER

## 2023-12-21 ASSESSMENT — ENCOUNTER SYMPTOMS
SHORTNESS OF BREATH: 0
HEMATURIA: 0
DYSURIA: 0
SORE THROAT: 0
HEADACHES: 0
ABDOMINAL PAIN: 0
MYALGIAS: 0
FEVER: 0

## 2023-12-21 NOTE — PROGRESS NOTES
[facility] Family Medicine at [facility]     Reason for visit:   Chief Complaint   Patient presents with   • Follow-up     4 Mo F/U      HPI    Hyperlipidemia: taking medication regularly. Tries to eat a low fat low carbohydrate diet.,  He is exercising 2 tmes a week at the gym). Does cardio as well (plays badminton 2-3times a week)  Fear of public speaking:  Propranolol working well        He'll think about  Shingrix and Tdap vaccines        Sushant Hazel is a 53 y.o. male      The following have been reviewed and updated as appropriate in this visit  Patient Active Problem List    Diagnosis Date Noted   • Fear of public speaking 03/23/2023   • Mixed hyperlipidemia 08/29/2018   • Mitral valve insufficiency 08/29/2018   • Glaucoma suspect 08/28/2018   • Rhinitis sicca 01/02/2018   • Cervical arthritis 04/28/2015     Past Medical History:   Diagnosis Date   • Hyperlipemia    • Kidney stones 06/2011   • Lyme disease 04/2004   • Mitral valve prolapse    • Other chest pain 9/10/2020     History reviewed. No pertinent surgical history.  Social History     Socioeconomic History   • Marital status:      Spouse name: Not on file   • Number of children: Not on file   • Years of education: Not on file   • Highest education level: Not on file   Occupational History   • Not on file   Tobacco Use   • Smoking status: Never   • Smokeless tobacco: Never   Substance and Sexual Activity   • Alcohol use: Not Currently     Alcohol/week: 2.0 - 4.0 standard drinks of alcohol     Types: 2 - 4 Standard drinks or equivalent per week     Comment: Socially    • Drug use: No   • Sexual activity: Yes     Partners: Female   Other Topics Concern   • Not on file   Social History Narrative   • Not on file     Social Determinants of Health     Financial Resource Strain: Not on file   Food Insecurity: Not on file   Transportation Needs: Not on file   Physical Activity: Not on file   Stress: Not on file   Social Connections: Not on file  "  Intimate Partner Violence: Not on file   Housing Stability: Not on file       Family History   Problem Relation Age of Onset   • Hyperlipidemia Biological Mother    • Hyperlipidemia Biological Father    • No Known Problems Biological Sister    • No Known Problems Biological Brother    • Prostate cancer Other    • Diabetes Other    • Hypertension Other        No Known Allergies    Current Outpatient Medications   Medication Sig Dispense Refill   • cholecalciferol, vitamin D3, 25 mcg (1,000 unit) capsule 5,000 Units.     • coenzyme Q10 (COQ10) 100 mg capsule Take 100 mg by mouth daily.     • propranoloL (INDERAL) 10 mg tablet Take 1 tablet by oral route 1 hour prior to public speaking. 30 tablet 0   • rosuvastatin (CRESTOR) 10 mg tablet TAKE 1 TABLET BY MOUTH EVERY DAY 90 tablet 1     No current facility-administered medications for this visit.     Review of Systems   Constitutional: Negative for fever.   HENT: Negative for ear pain and sore throat.    Respiratory: Negative for shortness of breath.    Cardiovascular: Negative for chest pain.   Gastrointestinal: Negative for abdominal pain.   Genitourinary: Negative for dysuria and hematuria.   Musculoskeletal: Negative for myalgias.   Skin: Negative for rash.   Neurological: Negative for headaches.     Objective   Vitals:    12/21/23 0913   BP: 119/74   BP Location: Left upper arm   Patient Position: Sitting   Pulse: 68   Resp: 16   Temp: 36.6 °C (97.8 °F)   SpO2: 99%   Weight: 61.9 kg (136 lb 6.4 oz)   Height: 1.626 m (5' 4\")     Body mass index is 23.41 kg/m².    Physical Exam  Constitutional:       General: He is not in acute distress.     Appearance: He is well-developed.   HENT:      Head: Normocephalic and atraumatic.      Right Ear: Tympanic membrane and ear canal normal.      Left Ear: Tympanic membrane and ear canal normal.   Eyes:      General: Lids are normal.      Conjunctiva/sclera: Conjunctivae normal.      Pupils: Pupils are equal, round, and reactive " to light.   Neck:      Thyroid: No thyromegaly.   Cardiovascular:      Rate and Rhythm: Normal rate and regular rhythm.      Heart sounds: Normal heart sounds. No murmur heard.     No friction rub. No gallop.   Pulmonary:      Effort: Pulmonary effort is normal.      Breath sounds: Normal breath sounds.   Abdominal:      Palpations: Abdomen is soft.      Tenderness: There is no abdominal tenderness.   Musculoskeletal:      Right lower leg: No edema.      Left lower leg: No edema.   Lymphadenopathy:      Cervical: No cervical adenopathy.   Skin:     Coloration: Skin is not cyanotic.      Nails: There is no clubbing.   Neurological:      Mental Status: He is alert.       Procedures       POINT OF CARE TESTING:    No results found for this visit on 12/21/23.     Assessment   Diagnoses and all orders for this visit:    Mixed hyperlipidemia (Primary)  Assessment & Plan:  Lipids acceptable. Continue medical regimen and check CMP Lipids  4 months    Orders:  -     Comprehensive metabolic panel; Future  -     Lipid panel; Future    Fear of public speaking  Assessment & Plan:  Doing well. Continue prn propranolol.      Other orders  -     propranoloL (INDERAL) 10 mg tablet; Take 1 tablet by oral route 1 hour prior to public speaking.       Educated patient on any new medications/doses that I prescribed today and patient expressed understanding and patient expressed understanding of and agreement with plan  Return in about 4 months (around 4/21/2024) for Next scheduled follow-up.     Mercy Walton MD  12/21/2023

## 2024-01-15 RX ORDER — PROPRANOLOL HYDROCHLORIDE 10 MG/1
TABLET ORAL
Qty: 90 TABLET | Refills: 0 | Status: SHIPPED | OUTPATIENT
Start: 2024-01-15

## 2024-01-15 NOTE — TELEPHONE ENCOUNTER
"Medicine Refill Request    Last Office Visit: 12/21/2023   Last Consult Visit: Visit date not found  Last Telemedicine Visit: 12/17/2020 Mercy Walton MD    Next Appointment: 4/19/2024      Current Outpatient Medications:   •  cholecalciferol, vitamin D3, 25 mcg (1,000 unit) capsule, 5,000 Units., Disp: , Rfl:   •  coenzyme Q10 (COQ10) 100 mg capsule, Take 100 mg by mouth daily., Disp: , Rfl:   •  propranoloL (INDERAL) 10 mg tablet, Take 1 tablet by oral route 1 hour prior to public speaking., Disp: 30 tablet, Rfl: 0  •  rosuvastatin (CRESTOR) 10 mg tablet, TAKE 1 TABLET BY MOUTH EVERY DAY, Disp: 90 tablet, Rfl: 1      BP Readings from Last 3 Encounters:   12/21/23 119/74   08/07/23 118/80   03/23/23 128/82       Recent Lab results:  Lab Results   Component Value Date    CHOL 143 12/15/2023   ,   Lab Results   Component Value Date    HDL 50 12/15/2023   ,   Lab Results   Component Value Date    LDLCALC 77 12/15/2023   ,   Lab Results   Component Value Date    TRIG 80 12/15/2023        Lab Results   Component Value Date    GLUCOSE 92 12/15/2023   , No results found for: \"HGBA1C\"      Lab Results   Component Value Date    CREATININE 1.0 12/15/2023       Lab Results   Component Value Date    TSH 1.20 12/15/2023         No results found for: \"HGBA1C\"  "

## 2024-04-19 ENCOUNTER — APPOINTMENT (OUTPATIENT)
Dept: LAB | Age: 54
End: 2024-04-19
Attending: FAMILY MEDICINE
Payer: COMMERCIAL

## 2024-04-19 DIAGNOSIS — E78.2 MIXED HYPERLIPIDEMIA: ICD-10-CM

## 2024-04-19 LAB
ALBUMIN SERPL-MCNC: 4.3 G/DL (ref 3.5–5.7)
ALP SERPL-CCNC: 55 IU/L (ref 34–125)
ALT SERPL-CCNC: 38 IU/L (ref 7–52)
ANION GAP SERPL CALC-SCNC: 4 MEQ/L (ref 3–15)
AST SERPL-CCNC: 27 IU/L (ref 13–39)
BILIRUB SERPL-MCNC: 0.9 MG/DL (ref 0.3–1.2)
BUN SERPL-MCNC: 18 MG/DL (ref 7–25)
CALCIUM SERPL-MCNC: 9.3 MG/DL (ref 8.6–10.3)
CHLORIDE SERPL-SCNC: 104 MEQ/L (ref 98–107)
CHOLEST SERPL-MCNC: 151 MG/DL
CO2 SERPL-SCNC: 32 MEQ/L (ref 21–31)
CREAT SERPL-MCNC: 1 MG/DL (ref 0.7–1.3)
EGFRCR SERPLBLD CKD-EPI 2021: >60 ML/MIN/1.73M*2
GLUCOSE SERPL-MCNC: 86 MG/DL (ref 70–99)
HDLC SERPL-MCNC: 49 MG/DL
HDLC SERPL: 3.1 {RATIO}
LDLC SERPL CALC-MCNC: 72 MG/DL
NONHDLC SERPL-MCNC: 102 MG/DL
POTASSIUM SERPL-SCNC: 4 MEQ/L (ref 3.5–5.1)
PROT SERPL-MCNC: 6.7 G/DL (ref 6–8.2)
SODIUM SERPL-SCNC: 140 MEQ/L (ref 136–145)
TRIGL SERPL-MCNC: 151 MG/DL

## 2024-04-19 PROCEDURE — 36415 COLL VENOUS BLD VENIPUNCTURE: CPT

## 2024-04-19 PROCEDURE — 80061 LIPID PANEL: CPT

## 2024-04-19 PROCEDURE — 80053 COMPREHEN METABOLIC PANEL: CPT

## 2024-04-22 ENCOUNTER — TELEPHONE (OUTPATIENT)
Dept: FAMILY MEDICINE | Facility: CLINIC | Age: 54
End: 2024-04-22
Payer: COMMERCIAL

## 2024-04-22 NOTE — TELEPHONE ENCOUNTER
----- Message from Mercy Walton MD sent at 4/22/2024  7:27 AM EDT -----  Labs look good. Continue same medication. He is due for an appt and should schedule one. I'll order next labs at his appt

## 2024-05-16 DIAGNOSIS — E78.2 MIXED HYPERLIPIDEMIA: ICD-10-CM

## 2024-05-16 RX ORDER — ROSUVASTATIN CALCIUM 10 MG/1
TABLET, COATED ORAL
Qty: 30 TABLET | Refills: 1 | Status: SHIPPED | OUTPATIENT
Start: 2024-05-16 | End: 2024-06-13 | Stop reason: SDUPTHER

## 2024-06-13 DIAGNOSIS — E78.2 MIXED HYPERLIPIDEMIA: ICD-10-CM

## 2024-06-13 RX ORDER — ROSUVASTATIN CALCIUM 10 MG/1
10 TABLET, COATED ORAL DAILY
Qty: 90 TABLET | Refills: 1 | Status: SHIPPED | OUTPATIENT
Start: 2024-06-13 | End: 2024-12-16

## 2024-06-13 NOTE — TELEPHONE ENCOUNTER
"Medicine Refill Request    Last Office Visit: 12/21/2023   Last Consult Visit: Visit date not found  Last Telemedicine Visit: 12/17/2020 Mercy Walton MD    Next Appointment: 6/28/2024      Current Outpatient Medications:     cholecalciferol, vitamin D3, 25 mcg (1,000 unit) capsule, 5,000 Units., Disp: , Rfl:     coenzyme Q10 (COQ10) 100 mg capsule, Take 100 mg by mouth daily., Disp: , Rfl:     propranoloL (INDERAL) 10 mg tablet, Take 1 tablet by oral route 1 hour prior to public speaking., Disp: 90 tablet, Rfl: 0    rosuvastatin (CRESTOR) 10 mg tablet, TAKE 1 TABLET BY MOUTH EVERY DAY, Disp: 30 tablet, Rfl: 1      BP Readings from Last 3 Encounters:   12/21/23 119/74   08/07/23 118/80   03/23/23 128/82       Recent Lab results:  Lab Results   Component Value Date    CHOL 151 04/19/2024   ,   Lab Results   Component Value Date    HDL 49 04/19/2024   ,   Lab Results   Component Value Date    LDLCALC 72 04/19/2024   ,   Lab Results   Component Value Date    TRIG 151 (H) 04/19/2024        Lab Results   Component Value Date    GLUCOSE 86 04/19/2024   , No results found for: \"HGBA1C\"      Lab Results   Component Value Date    CREATININE 1.0 04/19/2024       Lab Results   Component Value Date    TSH 1.20 12/15/2023         No results found for: \"HGBA1C\"   "

## 2024-06-28 ENCOUNTER — OFFICE VISIT (OUTPATIENT)
Dept: FAMILY MEDICINE | Facility: CLINIC | Age: 54
End: 2024-06-28
Payer: COMMERCIAL

## 2024-06-28 VITALS
TEMPERATURE: 98.2 F | DIASTOLIC BLOOD PRESSURE: 82 MMHG | OXYGEN SATURATION: 98 % | BODY MASS INDEX: 24.05 KG/M2 | SYSTOLIC BLOOD PRESSURE: 124 MMHG | HEART RATE: 68 BPM | RESPIRATION RATE: 13 BRPM | WEIGHT: 140.13 LBS

## 2024-06-28 DIAGNOSIS — E78.2 MIXED HYPERLIPIDEMIA: Primary | ICD-10-CM

## 2024-06-28 DIAGNOSIS — F40.248 FEAR OF PUBLIC SPEAKING: ICD-10-CM

## 2024-06-28 PROCEDURE — 3008F BODY MASS INDEX DOCD: CPT | Performed by: FAMILY MEDICINE

## 2024-06-28 PROCEDURE — 99214 OFFICE O/P EST MOD 30 MIN: CPT | Performed by: FAMILY MEDICINE

## 2024-06-28 ASSESSMENT — ENCOUNTER SYMPTOMS
FEVER: 0
HEMATURIA: 0
DYSURIA: 0
SHORTNESS OF BREATH: 0
SORE THROAT: 0
HEADACHES: 0
ABDOMINAL PAIN: 0
MYALGIAS: 0

## 2024-06-28 ASSESSMENT — PATIENT HEALTH QUESTIONNAIRE - PHQ9: SUM OF ALL RESPONSES TO PHQ9 QUESTIONS 1 & 2: 0

## 2024-06-28 NOTE — PROGRESS NOTES
[facility] Family Medicine at [facility]     Reason for visit:   Chief Complaint   Patient presents with    Follow-up     Chronic conditions      HPI    Hyperlipidemia: taking medication regularly. Tries to eat a low fat low carbohydrate diet.,  He is exercising 2 tmes a week at the gym). Plays badminton 3 times a week)  Fear of public speaking:  Propranolol working well. Uses it 1-2 times a month    Getting ready to visit his parents in the [facility]     He'll think about  Shingrix and Tdap vaccines  He does not want to be tested for HIV or Hepatitis C and does not want the Hepatitis B vaccine        Sushant Hazel is a 54 y.o. male      The following have been reviewed and updated as appropriate in this visit  Patient Active Problem List    Diagnosis Date Noted    Fear of public speaking 03/23/2023    Mixed hyperlipidemia 08/29/2018    Mitral valve insufficiency 08/29/2018    Glaucoma suspect 08/28/2018    Rhinitis sicca 01/02/2018    Cervical arthritis 04/28/2015     Past Medical History:   Diagnosis Date    Hyperlipemia     Kidney stones 06/2011    Lyme disease 04/2004    Mitral valve prolapse     Other chest pain 9/10/2020     History reviewed. No pertinent surgical history.  Social History     Socioeconomic History    Marital status:      Spouse name: Not on file    Number of children: Not on file    Years of education: Not on file    Highest education level: Not on file   Occupational History    Not on file   Tobacco Use    Smoking status: Never    Smokeless tobacco: Never   Substance and Sexual Activity    Alcohol use: Not Currently     Alcohol/week: 2.0 - 4.0 standard drinks of alcohol     Types: 2 - 4 Standard drinks or equivalent per week     Comment: Socially     Drug use: No    Sexual activity: Yes     Partners: Female   Other Topics Concern    Not on file   Social History Narrative    Not on file     Social Determinants of Health     Financial Resource Strain: Not on file   Food Insecurity: Not  on file   Transportation Needs: Not on file   Physical Activity: Not on file   Stress: Not on file   Social Connections: Not on file   Intimate Partner Violence: Not on file   Housing Stability: Not on file       Family History   Problem Relation Age of Onset    Hyperlipidemia Biological Mother     Hyperlipidemia Biological Father     No Known Problems Biological Sister     No Known Problems Biological Brother     Prostate cancer Other     Diabetes Other     Hypertension Other        No Known Allergies    Current Outpatient Medications   Medication Sig Dispense Refill    cholecalciferol, vitamin D3, 25 mcg (1,000 unit) capsule 5,000 Units.      coenzyme Q10 (COQ10) 100 mg capsule Take 100 mg by mouth daily.      propranoloL (INDERAL) 10 mg tablet Take 1 tablet by oral route 1 hour prior to public speaking. 90 tablet 0    rosuvastatin (CRESTOR) 10 mg tablet Take 1 tablet (10 mg total) by mouth daily. 90 tablet 1     No current facility-administered medications for this visit.     Review of Systems   Constitutional:  Negative for fever.   HENT:  Negative for ear pain and sore throat.    Respiratory:  Negative for shortness of breath.    Cardiovascular:  Negative for chest pain.   Gastrointestinal:  Negative for abdominal pain.   Genitourinary:  Negative for dysuria and hematuria.   Musculoskeletal:  Negative for myalgias.   Skin:  Negative for rash.   Neurological:  Negative for headaches.     Objective   Vitals:    06/28/24 1115   BP: 124/82   Pulse: 68   Resp: 13   Temp: 36.8 °C (98.2 °F)   SpO2: 98%   Weight: 63.6 kg (140 lb 2 oz)     Body mass index is 24.05 kg/m².    Physical Exam  Constitutional:       General: He is not in acute distress.     Appearance: He is well-developed.   HENT:      Head: Normocephalic and atraumatic.      Right Ear: Tympanic membrane and ear canal normal.      Left Ear: Tympanic membrane and ear canal normal.   Eyes:      General: Lids are normal.      Conjunctiva/sclera: Conjunctivae  normal.      Pupils: Pupils are equal, round, and reactive to light.   Neck:      Thyroid: No thyromegaly.   Cardiovascular:      Rate and Rhythm: Normal rate and regular rhythm.      Heart sounds: Normal heart sounds. No murmur heard.     No friction rub. No gallop.   Pulmonary:      Effort: Pulmonary effort is normal.      Breath sounds: Normal breath sounds.   Abdominal:      Palpations: Abdomen is soft.      Tenderness: There is no abdominal tenderness.   Musculoskeletal:      Right lower leg: No edema.      Left lower leg: No edema.   Lymphadenopathy:      Cervical: No cervical adenopathy.   Skin:     Coloration: Skin is not cyanotic.      Nails: There is no clubbing.   Neurological:      Mental Status: He is alert.       Procedures       POINT OF CARE TESTING:    No results found for this visit on 06/28/24.     Assessment   Diagnoses and all orders for this visit:    Mixed hyperlipidemia (Primary)  Assessment & Plan:  Lipids acceptable. Continue medical regimen and check CMP Lipids  TSH 6 months    Orders:  -     Comprehensive metabolic panel; Future  -     Lipid panel; Future  -     TSH 3rd Generation; Future    Fear of public speaking  Assessment & Plan:  Doing well. Continue prn propranolol.           Educated patient on any new medications/doses that I prescribed today and patient expressed understanding and patient expressed understanding of and agreement with plan  Return in about 6 months (around 12/28/2024) for Next scheduled follow-up.     Mercy Walton MD  6/28/2024

## 2024-08-21 ENCOUNTER — TELEPHONE (OUTPATIENT)
Dept: FAMILY MEDICINE | Facility: CLINIC | Age: 54
End: 2024-08-21
Payer: COMMERCIAL

## 2024-12-14 DIAGNOSIS — E78.2 MIXED HYPERLIPIDEMIA: ICD-10-CM

## 2024-12-16 ENCOUNTER — APPOINTMENT (OUTPATIENT)
Dept: LAB | Age: 54
End: 2024-12-16
Attending: FAMILY MEDICINE
Payer: COMMERCIAL

## 2024-12-16 DIAGNOSIS — E78.2 MIXED HYPERLIPIDEMIA: ICD-10-CM

## 2024-12-16 LAB
ALBUMIN SERPL-MCNC: 4.5 G/DL (ref 3.5–5.7)
ALP SERPL-CCNC: 55 IU/L (ref 34–125)
ALT SERPL-CCNC: 30 IU/L (ref 7–52)
ANION GAP SERPL CALC-SCNC: 5 MEQ/L (ref 3–15)
AST SERPL-CCNC: 23 IU/L (ref 13–39)
BILIRUB SERPL-MCNC: 0.8 MG/DL (ref 0.3–1.2)
BUN SERPL-MCNC: 19 MG/DL (ref 7–25)
CALCIUM SERPL-MCNC: 9.3 MG/DL (ref 8.6–10.3)
CHLORIDE SERPL-SCNC: 104 MEQ/L (ref 98–107)
CHOLEST SERPL-MCNC: 150 MG/DL
CO2 SERPL-SCNC: 32 MEQ/L (ref 21–31)
CREAT SERPL-MCNC: 1 MG/DL (ref 0.7–1.3)
EGFRCR SERPLBLD CKD-EPI 2021: >60 ML/MIN/1.73M*2
GLUCOSE SERPL-MCNC: 88 MG/DL (ref 70–99)
HDLC SERPL-MCNC: 46 MG/DL
HDLC SERPL: 3.3 {RATIO}
LDLC SERPL CALC-MCNC: 79 MG/DL
NONHDLC SERPL-MCNC: 104 MG/DL
POTASSIUM SERPL-SCNC: 3.9 MEQ/L (ref 3.5–5.1)
PROT SERPL-MCNC: 6.9 G/DL (ref 6–8.2)
SODIUM SERPL-SCNC: 141 MEQ/L (ref 136–145)
TRIGL SERPL-MCNC: 127 MG/DL
TSH SERPL DL<=0.05 MIU/L-ACNC: 2.14 MIU/L (ref 0.34–5.6)

## 2024-12-16 PROCEDURE — 80061 LIPID PANEL: CPT

## 2024-12-16 PROCEDURE — 36415 COLL VENOUS BLD VENIPUNCTURE: CPT

## 2024-12-16 PROCEDURE — 84443 ASSAY THYROID STIM HORMONE: CPT

## 2024-12-16 PROCEDURE — 80053 COMPREHEN METABOLIC PANEL: CPT

## 2024-12-16 RX ORDER — ROSUVASTATIN CALCIUM 10 MG/1
10 TABLET, COATED ORAL DAILY
Qty: 90 TABLET | Refills: 1 | Status: SHIPPED | OUTPATIENT
Start: 2024-12-16 | End: 2025-01-20 | Stop reason: SDUPTHER

## 2024-12-17 ENCOUNTER — TELEPHONE (OUTPATIENT)
Dept: FAMILY MEDICINE | Facility: CLINIC | Age: 54
End: 2024-12-17
Payer: COMMERCIAL

## 2024-12-17 NOTE — TELEPHONE ENCOUNTER
----- Message from Mercy Walton sent at 12/16/2024  5:04 PM EST -----  Labs look good. Continue same medication and I'll order next labs when I see him in January

## 2025-01-20 ENCOUNTER — OFFICE VISIT (OUTPATIENT)
Dept: FAMILY MEDICINE | Facility: CLINIC | Age: 55
End: 2025-01-20
Payer: COMMERCIAL

## 2025-01-20 VITALS
BODY MASS INDEX: 23.49 KG/M2 | DIASTOLIC BLOOD PRESSURE: 72 MMHG | TEMPERATURE: 98.2 F | WEIGHT: 137.6 LBS | HEART RATE: 80 BPM | OXYGEN SATURATION: 97 % | HEIGHT: 64 IN | SYSTOLIC BLOOD PRESSURE: 130 MMHG

## 2025-01-20 DIAGNOSIS — E78.2 MIXED HYPERLIPIDEMIA: Primary | ICD-10-CM

## 2025-01-20 DIAGNOSIS — F40.248 FEAR OF PUBLIC SPEAKING: ICD-10-CM

## 2025-01-20 PROCEDURE — 99214 OFFICE O/P EST MOD 30 MIN: CPT | Performed by: FAMILY MEDICINE

## 2025-01-20 PROCEDURE — 3008F BODY MASS INDEX DOCD: CPT | Performed by: FAMILY MEDICINE

## 2025-01-20 RX ORDER — ROSUVASTATIN CALCIUM 10 MG/1
10 TABLET, COATED ORAL DAILY
Qty: 90 TABLET | Refills: 1 | Status: SHIPPED | OUTPATIENT
Start: 2025-01-20

## 2025-01-20 ASSESSMENT — ENCOUNTER SYMPTOMS
SHORTNESS OF BREATH: 0
HEMATURIA: 0
FEVER: 0
DYSURIA: 0
SORE THROAT: 0
ABDOMINAL PAIN: 0
HEADACHES: 0
MYALGIAS: 0

## 2025-01-20 NOTE — PROGRESS NOTES
[facility] at [facility]     Reason for visit:   Chief Complaint   Patient presents with    Follow-up      HPI    Hyperlipidemia: taking medication regularly. Tries to eat a low fat low carbohydrate diet.,  He is exercising 2 tmes a week at the gym and plays badminton 2 times a week)  Fear of public speaking:  Propranolol working well. Uses it 1-2 times a month but not since November 2024    He is going to go for a colonoscopy this spring    He'll think about  Shingrix and Tdap vaccines      Sushant Hazel is a 54 y.o. male      The following have been reviewed and updated as appropriate in this visit  Patient Active Problem List    Diagnosis Date Noted    Fear of public speaking 03/23/2023    Mixed hyperlipidemia 08/29/2018    Mitral valve insufficiency 08/29/2018    Glaucoma suspect 08/28/2018    Rhinitis sicca 01/02/2018    Cervical arthritis 04/28/2015     Past Medical History:   Diagnosis Date    Hyperlipemia     Kidney stones 06/2011    Lyme disease 04/2004    Mitral valve prolapse     Other chest pain 9/10/2020     No past surgical history on file.  Social History     Socioeconomic History    Marital status:      Spouse name: Not on file    Number of children: Not on file    Years of education: Not on file    Highest education level: Not on file   Occupational History    Not on file   Tobacco Use    Smoking status: Never    Smokeless tobacco: Never   Substance and Sexual Activity    Alcohol use: Not Currently     Alcohol/week: 2.0 - 4.0 standard drinks of alcohol     Types: 2 - 4 Standard drinks or equivalent per week     Comment: Socially     Drug use: No    Sexual activity: Yes     Partners: Female   Other Topics Concern    Not on file   Social History Narrative    Not on file     Social Drivers of Health     Financial Resource Strain: Not on file   Food Insecurity: Not on file   Transportation Needs: Not on file   Physical Activity: Not on file   Stress: Not on file   Social  "Connections: Not on file   Intimate Partner Violence: Not on file   Housing Stability: Not on file       Family History   Problem Relation Name Age of Onset    Hyperlipidemia Biological Mother      Hyperlipidemia Biological Father      No Known Problems Biological Sister      No Known Problems Biological Brother      Prostate cancer Other      Diabetes Other      Hypertension Other         No Known Allergies    Current Outpatient Medications   Medication Sig Dispense Refill    cholecalciferol, vitamin D3, 25 mcg (1,000 unit) capsule 5,000 Units.      coenzyme Q10 (COQ10) 100 mg capsule Take 100 mg by mouth daily.      propranoloL (INDERAL) 10 mg tablet Take 1 tablet by oral route 1 hour prior to public speaking. 90 tablet 0    rosuvastatin (CRESTOR) 10 mg tablet Take 1 tablet (10 mg total) by mouth daily. 90 tablet 1     No current facility-administered medications for this visit.     Review of Systems   Constitutional:  Negative for fever.   HENT:  Negative for ear pain and sore throat.    Respiratory:  Negative for shortness of breath.    Cardiovascular:  Negative for chest pain.   Gastrointestinal:  Negative for abdominal pain.   Genitourinary:  Negative for dysuria and hematuria.   Musculoskeletal:  Negative for myalgias.   Skin:  Negative for rash.   Neurological:  Negative for headaches.     Objective   Vitals:    01/20/25 1432 01/20/25 1447   BP: 138/74 130/72   BP Location: Right upper arm    Patient Position: Sitting    Pulse: 80    Temp: 36.8 °C (98.2 °F)    TempSrc: Oral    SpO2: 97%    Weight: 62.4 kg (137 lb 9.6 oz)    Height: 1.626 m (5' 4\")      Body mass index is 23.62 kg/m².    Physical Exam  Constitutional:       General: He is not in acute distress.     Appearance: He is well-developed.   HENT:      Head: Normocephalic and atraumatic.      Right Ear: Tympanic membrane and ear canal normal.      Left Ear: Tympanic membrane and ear canal normal.   Eyes:      General: Lids are normal.      " Conjunctiva/sclera: Conjunctivae normal.      Pupils: Pupils are equal, round, and reactive to light.   Neck:      Thyroid: No thyromegaly.   Cardiovascular:      Rate and Rhythm: Normal rate and regular rhythm.      Heart sounds: Normal heart sounds. No murmur heard.     No friction rub. No gallop.   Pulmonary:      Effort: Pulmonary effort is normal.      Breath sounds: Normal breath sounds.   Abdominal:      Palpations: Abdomen is soft.      Tenderness: There is no abdominal tenderness.   Musculoskeletal:      Right lower leg: No edema.      Left lower leg: No edema.   Lymphadenopathy:      Cervical: No cervical adenopathy.   Skin:     Coloration: Skin is not cyanotic.      Nails: There is no clubbing.   Neurological:      Mental Status: He is alert.       Procedures       POINT OF CARE TESTING:    No results found for this visit on 01/20/25.     Assessment   Diagnoses and all orders for this visit:    Mixed hyperlipidemia (Primary)  Assessment & Plan:  Lipids acceptable. Continue medical regimen and check CMP Lipids  6 months    Orders:  -     rosuvastatin (CRESTOR) 10 mg tablet; Take 1 tablet (10 mg total) by mouth daily.  -     Comprehensive metabolic panel; Future  -     Lipid panel; Future    Fear of public speaking  Assessment & Plan:  Doing well. Continue prn propranolol.           Educated patient on any new medications/doses that I prescribed today and patient expressed understanding and patient expressed understanding of and agreement with plan  Return in about 6 months (around 7/20/2025) for Next scheduled follow-up.     Mercy Walton MD  1/20/2025

## 2025-03-07 ENCOUNTER — TELEPHONE (OUTPATIENT)
Dept: FAMILY MEDICINE | Facility: CLINIC | Age: 55
End: 2025-03-07
Payer: COMMERCIAL

## 2025-04-01 ENCOUNTER — PATIENT OUTREACH (OUTPATIENT)
Dept: FAMILY MEDICINE | Facility: CLINIC | Age: 55
End: 2025-04-01
Payer: COMMERCIAL

## 2025-04-01 NOTE — PROGRESS NOTES
Care Gap Team has outreached to Sushant Hazel on behalf of their primary care provider.      Care Gap Source: [facility]    Care Gap(s) Identified: Colorectal Cancer Screening    Care Gap Status: Due    Outreach via: Telephone    Adult Preventive Wellness Protocol(s) Used: Colorectal Cancer Screening    Chart Review Completed: Yes  Patient interview completed: No  Inclusion Criteria: Met  Exclusion Criteria: None  Patient educated on recommended care: No    Order or Clinical Referral: None          Appointment provided: No                Called and left VM for patient letting them know that they are due for their colorectal cancer screening.  Asked patient to return my call.

## 2025-07-29 ENCOUNTER — TELEPHONE (OUTPATIENT)
Dept: CARDIOLOGY | Facility: CLINIC | Age: 55
End: 2025-07-29
Payer: COMMERCIAL

## 2025-08-01 NOTE — TELEPHONE ENCOUNTER
Patient returning call to schedule Cardiac Clearance visit.    Patient can be reached at [phone]

## 2025-08-04 ENCOUNTER — APPOINTMENT (OUTPATIENT)
Dept: LAB | Age: 55
End: 2025-08-04
Attending: FAMILY MEDICINE
Payer: COMMERCIAL

## 2025-08-04 DIAGNOSIS — E78.2 MIXED HYPERLIPIDEMIA: ICD-10-CM

## 2025-08-04 LAB
ALBUMIN SERPL-MCNC: 4.7 G/DL (ref 3.5–5.7)
ALP SERPL-CCNC: 58 IU/L (ref 34–125)
ALT SERPL-CCNC: 26 IU/L (ref 7–52)
ANION GAP SERPL CALC-SCNC: 6 MEQ/L (ref 3–15)
AST SERPL-CCNC: 22 IU/L (ref 13–39)
BILIRUB SERPL-MCNC: 1.1 MG/DL (ref 0.3–1.2)
BUN SERPL-MCNC: 23 MG/DL (ref 7–25)
CALCIUM SERPL-MCNC: 9.6 MG/DL (ref 8.6–10.3)
CHLORIDE SERPL-SCNC: 105 MEQ/L (ref 98–107)
CHOLEST SERPL-MCNC: 146 MG/DL
CO2 SERPL-SCNC: 31 MEQ/L (ref 21–31)
CREAT SERPL-MCNC: 1.1 MG/DL (ref 0.7–1.3)
EGFRCR SERPLBLD CKD-EPI 2021: >60 ML/MIN/1.73M*2
GLUCOSE SERPL-MCNC: 95 MG/DL (ref 70–99)
HDLC SERPL-MCNC: 48 MG/DL
HDLC SERPL: 3 {RATIO}
LDLC SERPL CALC-MCNC: 78 MG/DL
NONHDLC SERPL-MCNC: 98 MG/DL
POTASSIUM SERPL-SCNC: 4 MEQ/L (ref 3.5–5.1)
PROT SERPL-MCNC: 7.2 G/DL (ref 6–8.2)
SODIUM SERPL-SCNC: 142 MEQ/L (ref 136–145)
TRIGL SERPL-MCNC: 99 MG/DL

## 2025-08-04 PROCEDURE — 36415 COLL VENOUS BLD VENIPUNCTURE: CPT

## 2025-08-04 PROCEDURE — 80053 COMPREHEN METABOLIC PANEL: CPT

## 2025-08-04 PROCEDURE — 80061 LIPID PANEL: CPT

## 2025-08-08 ENCOUNTER — OFFICE VISIT (OUTPATIENT)
Dept: FAMILY MEDICINE | Facility: CLINIC | Age: 55
End: 2025-08-08
Payer: COMMERCIAL

## 2025-08-08 VITALS
OXYGEN SATURATION: 98 % | BODY MASS INDEX: 23.22 KG/M2 | WEIGHT: 136 LBS | SYSTOLIC BLOOD PRESSURE: 128 MMHG | DIASTOLIC BLOOD PRESSURE: 74 MMHG | HEART RATE: 70 BPM | HEIGHT: 64 IN | TEMPERATURE: 98.1 F

## 2025-08-08 DIAGNOSIS — F40.248 FEAR OF PUBLIC SPEAKING: ICD-10-CM

## 2025-08-08 DIAGNOSIS — E78.2 MIXED HYPERLIPIDEMIA: Primary | ICD-10-CM

## 2025-08-08 DIAGNOSIS — Z12.5 PROSTATE CANCER SCREENING: ICD-10-CM

## 2025-08-08 PROCEDURE — 3008F BODY MASS INDEX DOCD: CPT | Performed by: FAMILY MEDICINE

## 2025-08-08 PROCEDURE — 99214 OFFICE O/P EST MOD 30 MIN: CPT | Performed by: FAMILY MEDICINE

## 2025-08-08 RX ORDER — TRETINOIN 0.5 MG/G
CREAM TOPICAL NIGHTLY
COMMUNITY
Start: 2025-08-07

## 2025-08-08 RX ORDER — PROPRANOLOL HYDROCHLORIDE 10 MG/1
TABLET ORAL
Qty: 30 TABLET | Refills: 0 | Status: SHIPPED | OUTPATIENT
Start: 2025-08-08

## 2025-08-08 ASSESSMENT — PATIENT HEALTH QUESTIONNAIRE - PHQ9: SUM OF ALL RESPONSES TO PHQ9 QUESTIONS 1 & 2: 0

## 2025-08-08 ASSESSMENT — ENCOUNTER SYMPTOMS
MYALGIAS: 0
FEVER: 0
SHORTNESS OF BREATH: 0
HEMATURIA: 0
SORE THROAT: 0
ABDOMINAL PAIN: 0
HEADACHES: 0
DYSURIA: 0

## 2025-08-08 NOTE — PROGRESS NOTES
[facility] at [facility]     Reason for visit:   Chief Complaint   Patient presents with    Follow-up     Chronic Conditions       HPI    Hyperlipidemia: taking medication regularly. Tries to eat a low fat low carbohydrate diet.,  He is exercising 3 tmes a week at the gym and plays badminton 2 times a week)  Fear of public speaking:  Propranolol working well. Uses it about once a month.    He is going to go for a colonoscopy 08/25/2025    He'll think about  Shingrix and Tdap vaccines      Ssuhant Hazel is a 55 y.o. male      The following have been reviewed and updated as appropriate in this visit  Patient Active Problem List    Diagnosis Date Noted    Fear of public speaking 03/23/2023    Mixed hyperlipidemia 08/29/2018    Mitral valve insufficiency 08/29/2018    Glaucoma suspect 08/28/2018    Rhinitis sicca 01/02/2018    Cervical arthritis 04/28/2015     Past Medical History:   Diagnosis Date    Hyperlipemia     Kidney stones 06/2011    Lyme disease 04/2004    Mitral valve prolapse     Other chest pain 9/10/2020     No past surgical history on file.  Social History     Socioeconomic History    Marital status:      Spouse name: Not on file    Number of children: Not on file    Years of education: Not on file    Highest education level: Not on file   Occupational History    Not on file   Tobacco Use    Smoking status: Never    Smokeless tobacco: Never   Substance and Sexual Activity    Alcohol use: Not Currently     Alcohol/week: 2.0 - 4.0 standard drinks of alcohol     Types: 2 - 4 Standard drinks or equivalent per week     Comment: Socially     Drug use: No    Sexual activity: Yes     Partners: Female   Other Topics Concern    Not on file   Social History Narrative    Not on file     Social Drivers of Health     Financial Resource Strain: Not on file   Food Insecurity: Not on file   Transportation Needs: Not on file   Physical Activity: Not on file   Stress: Not on file   Social Connections:  "Not on file   Intimate Partner Violence: Not on file   Housing Stability: Not on file       Family History   Problem Relation Name Age of Onset    Hyperlipidemia Biological Mother      Hyperlipidemia Biological Father      No Known Problems Biological Sister      No Known Problems Biological Brother      Prostate cancer Other      Diabetes Other      Hypertension Other         No Known Allergies    Current Outpatient Medications   Medication Sig Dispense Refill    cholecalciferol, vitamin D3, 25 mcg (1,000 unit) capsule 5,000 Units.      coenzyme Q10 (COQ10) 100 mg capsule Take 100 mg by mouth daily.      propranoloL (INDERAL) 10 mg tablet Take 1 tablet by oral route 1 hour prior to public speaking. 30 tablet 0    rosuvastatin (CRESTOR) 10 mg tablet Take 1 tablet (10 mg total) by mouth daily. 90 tablet 1    tretinoin (RETIN-A) 0.05 % cream Apply topically nightly.       No current facility-administered medications for this visit.     Review of Systems   Constitutional:  Negative for fever.   HENT:  Negative for ear pain and sore throat.    Respiratory:  Negative for shortness of breath.    Cardiovascular:  Negative for chest pain.   Gastrointestinal:  Negative for abdominal pain.   Genitourinary:  Negative for dysuria and hematuria.   Musculoskeletal:  Negative for myalgias.   Skin:  Negative for rash.   Neurological:  Negative for headaches.     Objective   Vitals:    08/08/25 1112 08/08/25 1145   BP: (!) 150/86 128/74   BP Location: Left upper arm    Patient Position: Sitting    Pulse: 70    Temp: 36.7 °C (98.1 °F)    TempSrc: Temporal    SpO2: 98%    Weight: 61.7 kg (136 lb)    Height: 1.626 m (5' 4.02\")      Body mass index is 23.33 kg/m².    Physical Exam  Constitutional:       General: He is not in acute distress.     Appearance: He is well-developed.   HENT:      Head: Normocephalic and atraumatic.      Right Ear: Tympanic membrane and ear canal normal.      Left Ear: Tympanic membrane and ear canal normal. "   Eyes:      General: Lids are normal.      Conjunctiva/sclera: Conjunctivae normal.      Pupils: Pupils are equal, round, and reactive to light.   Neck:      Thyroid: No thyromegaly.   Cardiovascular:      Rate and Rhythm: Normal rate and regular rhythm.      Heart sounds: Normal heart sounds. No murmur heard.     No friction rub. No gallop.   Pulmonary:      Effort: Pulmonary effort is normal.      Breath sounds: Normal breath sounds.   Abdominal:      Palpations: Abdomen is soft.      Tenderness: There is no abdominal tenderness.   Musculoskeletal:      Right lower leg: No edema.      Left lower leg: No edema.   Lymphadenopathy:      Cervical: No cervical adenopathy.   Skin:     Coloration: Skin is not cyanotic.      Nails: There is no clubbing.   Neurological:      Mental Status: He is alert.       Procedures       POINT OF CARE TESTING:    No results found for this visit on 08/08/25.     Assessment   Diagnoses and all orders for this visit:    Mixed hyperlipidemia (Primary)  Assessment & Plan:  Lipids acceptable. Continue medical regimen and check CMP Lipids TSH  6 months    Orders:  -     Comprehensive metabolic panel; Future  -     Lipid panel; Future  -     TSH 3rd Generation; Future    Fear of public speaking  Assessment & Plan:  Doing well. Continue prn propranolol.      Prostate cancer screening  Comments:  Check PSA  Orders:  -     PSA; Future    Other orders  -     propranoloL (INDERAL) 10 mg tablet; Take 1 tablet by oral route 1 hour prior to public speaking.         Educated patient on any new medications/doses that I prescribed today and patient expressed understanding and patient expressed understanding of and agreement with plan  Return in about 6 months (around 2/8/2026) for Next scheduled follow-up.     Mercy Walton MD  8/8/2025

## 2025-08-14 ENCOUNTER — OFFICE VISIT (OUTPATIENT)
Dept: CARDIOLOGY | Facility: CLINIC | Age: 55
End: 2025-08-14
Payer: COMMERCIAL

## 2025-08-14 VITALS
HEART RATE: 75 BPM | OXYGEN SATURATION: 98 % | SYSTOLIC BLOOD PRESSURE: 122 MMHG | BODY MASS INDEX: 23.39 KG/M2 | RESPIRATION RATE: 16 BRPM | DIASTOLIC BLOOD PRESSURE: 78 MMHG | WEIGHT: 137 LBS | HEIGHT: 64 IN

## 2025-08-14 DIAGNOSIS — I34.0 MITRAL VALVE INSUFFICIENCY, UNSPECIFIED ETIOLOGY: ICD-10-CM

## 2025-08-14 DIAGNOSIS — E78.2 MIXED HYPERLIPIDEMIA: Primary | ICD-10-CM

## 2025-08-14 DIAGNOSIS — Z01.810 PREOPERATIVE CARDIOVASCULAR EXAMINATION: ICD-10-CM

## 2025-08-14 LAB
ATRIAL RATE: 64
P AXIS: 75
PR INTERVAL: 130
QRS DURATION: 82
QT INTERVAL: 372
QTC CALCULATION(BAZETT): 383
R AXIS: 43
T WAVE AXIS: 43
VENTRICULAR RATE: 64

## 2025-08-14 PROCEDURE — 93000 ELECTROCARDIOGRAM COMPLETE: CPT | Performed by: NURSE PRACTITIONER

## 2025-08-14 PROCEDURE — 3008F BODY MASS INDEX DOCD: CPT | Performed by: NURSE PRACTITIONER

## 2025-08-14 PROCEDURE — 99204 OFFICE O/P NEW MOD 45 MIN: CPT | Performed by: NURSE PRACTITIONER

## 2025-08-14 ASSESSMENT — ENCOUNTER SYMPTOMS
HOARSE VOICE: 0
HEMATOCHEZIA: 0
NAIL CHANGES: 0
NERVOUS/ANXIOUS: 0
BLURRED VISION: 0
IRREGULAR HEARTBEAT: 0
LIGHT-HEADEDNESS: 0
SPUTUM PRODUCTION: 0
NEAR-SYNCOPE: 0
ORTHOPNEA: 0
VERTIGO: 0
SYNCOPE: 0
DYSURIA: 0
INSOMNIA: 0
MYALGIAS: 0
DYSPNEA ON EXERTION: 0
WEAKNESS: 0
HEMATEMESIS: 0
ABDOMINAL PAIN: 0
PALPITATIONS: 0
PND: 0
SHORTNESS OF BREATH: 0
FEVER: 0
DIAPHORESIS: 0
WHEEZING: 0
JAUNDICE: 0
ALTERED MENTAL STATUS: 0
COLOR CHANGE: 0
HEMATURIA: 0
HEMOPTYSIS: 0

## 2025-09-03 RX ORDER — PROPRANOLOL HYDROCHLORIDE 10 MG/1
TABLET ORAL
Qty: 90 TABLET | Refills: 1 | Status: SHIPPED | OUTPATIENT
Start: 2025-09-03